# Patient Record
Sex: FEMALE | Race: WHITE | NOT HISPANIC OR LATINO | Employment: OTHER | URBAN - METROPOLITAN AREA
[De-identification: names, ages, dates, MRNs, and addresses within clinical notes are randomized per-mention and may not be internally consistent; named-entity substitution may affect disease eponyms.]

---

## 2017-04-14 ENCOUNTER — RECORDS - HEALTHEAST (OUTPATIENT)
Dept: LAB | Facility: CLINIC | Age: 82
End: 2017-04-14

## 2017-04-14 LAB
CHOLEST SERPL-MCNC: 163 MG/DL
FASTING STATUS PATIENT QL REPORTED: ABNORMAL
HDLC SERPL-MCNC: 38 MG/DL
LDLC SERPL CALC-MCNC: 92 MG/DL
TRIGL SERPL-MCNC: 163 MG/DL

## 2017-10-13 ENCOUNTER — RECORDS - HEALTHEAST (OUTPATIENT)
Dept: LAB | Facility: CLINIC | Age: 82
End: 2017-10-13

## 2017-10-13 LAB
CHOLEST SERPL-MCNC: 231 MG/DL
FASTING STATUS PATIENT QL REPORTED: ABNORMAL
HDLC SERPL-MCNC: 32 MG/DL
LDLC SERPL CALC-MCNC: 157 MG/DL
TRIGL SERPL-MCNC: 212 MG/DL

## 2018-04-13 ENCOUNTER — RECORDS - HEALTHEAST (OUTPATIENT)
Dept: LAB | Facility: CLINIC | Age: 83
End: 2018-04-13

## 2018-04-13 LAB
ALBUMIN SERPL-MCNC: 3.5 G/DL (ref 3.5–5)
ALP SERPL-CCNC: 86 U/L (ref 45–120)
ALT SERPL W P-5'-P-CCNC: 13 U/L (ref 0–45)
ANION GAP SERPL CALCULATED.3IONS-SCNC: 8 MMOL/L (ref 5–18)
AST SERPL W P-5'-P-CCNC: 23 U/L (ref 0–40)
BILIRUB SERPL-MCNC: 0.7 MG/DL (ref 0–1)
BUN SERPL-MCNC: 10 MG/DL (ref 8–28)
CALCIUM SERPL-MCNC: 8.9 MG/DL (ref 8.5–10.5)
CHLORIDE BLD-SCNC: 105 MMOL/L (ref 98–107)
CHOLEST SERPL-MCNC: 160 MG/DL
CO2 SERPL-SCNC: 26 MMOL/L (ref 22–31)
CREAT SERPL-MCNC: 0.83 MG/DL (ref 0.6–1.1)
FASTING STATUS PATIENT QL REPORTED: ABNORMAL
GFR SERPL CREATININE-BSD FRML MDRD: >60 ML/MIN/1.73M2
GLUCOSE BLD-MCNC: 99 MG/DL (ref 70–125)
HDLC SERPL-MCNC: 34 MG/DL
LDLC SERPL CALC-MCNC: 95 MG/DL
POTASSIUM BLD-SCNC: 4.2 MMOL/L (ref 3.5–5)
PROT SERPL-MCNC: 6.6 G/DL (ref 6–8)
SODIUM SERPL-SCNC: 139 MMOL/L (ref 136–145)
TRIGL SERPL-MCNC: 154 MG/DL

## 2018-11-17 ENCOUNTER — RECORDS - HEALTHEAST (OUTPATIENT)
Dept: LAB | Facility: CLINIC | Age: 83
End: 2018-11-17

## 2018-11-17 LAB
ANION GAP SERPL CALCULATED.3IONS-SCNC: 8 MMOL/L (ref 5–18)
BNP SERPL-MCNC: 30 PG/ML (ref 0–167)
BUN SERPL-MCNC: 13 MG/DL (ref 8–28)
CALCIUM SERPL-MCNC: 8.8 MG/DL (ref 8.5–10.5)
CHLORIDE BLD-SCNC: 106 MMOL/L (ref 98–107)
CO2 SERPL-SCNC: 24 MMOL/L (ref 22–31)
CREAT SERPL-MCNC: 0.83 MG/DL (ref 0.6–1.1)
GFR SERPL CREATININE-BSD FRML MDRD: >60 ML/MIN/1.73M2
GLUCOSE BLD-MCNC: 91 MG/DL (ref 70–125)
POTASSIUM BLD-SCNC: 4.2 MMOL/L (ref 3.5–5)
SODIUM SERPL-SCNC: 138 MMOL/L (ref 136–145)

## 2018-11-23 ENCOUNTER — RECORDS - HEALTHEAST (OUTPATIENT)
Dept: LAB | Facility: CLINIC | Age: 83
End: 2018-11-23

## 2018-11-23 LAB
C REACTIVE PROTEIN LHE: 5.6 MG/DL (ref 0–0.8)
RHEUMATOID FACT SERPL-ACNC: <15 IU/ML (ref 0–30)
URATE SERPL-MCNC: 8.7 MG/DL (ref 2–7.5)

## 2018-11-26 LAB
ANA SER QL: 0.2 U
B BURGDOR IGG+IGM SER QL: 0.39 INDEX VALUE

## 2018-12-03 ENCOUNTER — RECORDS - HEALTHEAST (OUTPATIENT)
Dept: LAB | Facility: CLINIC | Age: 83
End: 2018-12-03

## 2018-12-03 LAB — CK SERPL-CCNC: 28 U/L (ref 30–190)

## 2018-12-05 ENCOUNTER — OFFICE VISIT - HEALTHEAST (OUTPATIENT)
Dept: RHEUMATOLOGY | Facility: CLINIC | Age: 83
End: 2018-12-05

## 2018-12-05 DIAGNOSIS — M25.50 POLYARTHRALGIA: ICD-10-CM

## 2018-12-05 DIAGNOSIS — R70.0 ELEVATED SED RATE: ICD-10-CM

## 2018-12-05 DIAGNOSIS — M35.3 POLYMYALGIA RHEUMATICA (H): ICD-10-CM

## 2018-12-05 DIAGNOSIS — M15.0 PRIMARY OSTEOARTHRITIS INVOLVING MULTIPLE JOINTS: ICD-10-CM

## 2018-12-05 LAB
C REACTIVE PROTEIN LHE: 4.7 MG/DL (ref 0–0.8)
ERYTHROCYTE [SEDIMENTATION RATE] IN BLOOD BY WESTERGREN METHOD: 38 MM/HR (ref 0–20)

## 2018-12-05 ASSESSMENT — MIFFLIN-ST. JEOR: SCORE: 1236.89

## 2019-01-03 ENCOUNTER — OFFICE VISIT - HEALTHEAST (OUTPATIENT)
Dept: RHEUMATOLOGY | Facility: CLINIC | Age: 84
End: 2019-01-03

## 2019-01-03 DIAGNOSIS — M15.0 PRIMARY OSTEOARTHRITIS INVOLVING MULTIPLE JOINTS: ICD-10-CM

## 2019-01-03 DIAGNOSIS — R70.0 ELEVATED SED RATE: ICD-10-CM

## 2019-01-03 DIAGNOSIS — M25.50 POLYARTHRALGIA: ICD-10-CM

## 2019-01-03 DIAGNOSIS — M35.3 POLYMYALGIA RHEUMATICA (H): ICD-10-CM

## 2019-01-03 DIAGNOSIS — G56.01 RIGHT CARPAL TUNNEL SYNDROME: ICD-10-CM

## 2019-01-03 LAB
C REACTIVE PROTEIN LHE: 4.3 MG/DL (ref 0–0.8)
ERYTHROCYTE [SEDIMENTATION RATE] IN BLOOD BY WESTERGREN METHOD: 40 MM/HR (ref 0–20)

## 2019-02-19 ENCOUNTER — OFFICE VISIT - HEALTHEAST (OUTPATIENT)
Dept: RHEUMATOLOGY | Facility: CLINIC | Age: 84
End: 2019-02-19

## 2019-02-19 DIAGNOSIS — M35.3 POLYMYALGIA RHEUMATICA (H): ICD-10-CM

## 2019-02-19 DIAGNOSIS — M25.50 POLYARTHRALGIA: ICD-10-CM

## 2019-02-19 DIAGNOSIS — M15.0 PRIMARY OSTEOARTHRITIS INVOLVING MULTIPLE JOINTS: ICD-10-CM

## 2019-03-13 ENCOUNTER — RECORDS - HEALTHEAST (OUTPATIENT)
Dept: LAB | Facility: CLINIC | Age: 84
End: 2019-03-13

## 2019-03-13 LAB
ALBUMIN SERPL-MCNC: 3.4 G/DL (ref 3.5–5)
ALP SERPL-CCNC: 54 U/L (ref 45–120)
ALT SERPL W P-5'-P-CCNC: 16 U/L (ref 0–45)
ANION GAP SERPL CALCULATED.3IONS-SCNC: 10 MMOL/L (ref 5–18)
AST SERPL W P-5'-P-CCNC: 21 U/L (ref 0–40)
BILIRUB SERPL-MCNC: 0.4 MG/DL (ref 0–1)
BUN SERPL-MCNC: 25 MG/DL (ref 8–28)
CALCIUM SERPL-MCNC: 9.3 MG/DL (ref 8.5–10.5)
CHLORIDE BLD-SCNC: 105 MMOL/L (ref 98–107)
CHOLEST SERPL-MCNC: 230 MG/DL
CO2 SERPL-SCNC: 24 MMOL/L (ref 22–31)
CREAT SERPL-MCNC: 0.89 MG/DL (ref 0.6–1.1)
FASTING STATUS PATIENT QL REPORTED: ABNORMAL
GFR SERPL CREATININE-BSD FRML MDRD: 60 ML/MIN/1.73M2
GLUCOSE BLD-MCNC: 125 MG/DL (ref 70–125)
HDLC SERPL-MCNC: 48 MG/DL
LDLC SERPL CALC-MCNC: 132 MG/DL
MAGNESIUM SERPL-MCNC: 1.8 MG/DL (ref 1.8–2.6)
POTASSIUM BLD-SCNC: 4.6 MMOL/L (ref 3.5–5)
PROT SERPL-MCNC: 6.4 G/DL (ref 6–8)
SODIUM SERPL-SCNC: 139 MMOL/L (ref 136–145)
TRIGL SERPL-MCNC: 250 MG/DL

## 2019-03-15 ENCOUNTER — RECORDS - HEALTHEAST (OUTPATIENT)
Dept: LAB | Facility: CLINIC | Age: 84
End: 2019-03-15

## 2019-03-15 LAB
FERRITIN SERPL-MCNC: 177 NG/ML (ref 10–130)
IRON SATN MFR SERPL: 31 % (ref 20–50)
IRON SERPL-MCNC: 84 UG/DL (ref 42–175)
TIBC SERPL-MCNC: 269 UG/DL (ref 313–563)
TRANSFERRIN SERPL-MCNC: 215 MG/DL (ref 212–360)
VIT B12 SERPL-MCNC: 156 PG/ML (ref 213–816)

## 2019-03-31 ENCOUNTER — COMMUNICATION - HEALTHEAST (OUTPATIENT)
Dept: SCHEDULING | Facility: CLINIC | Age: 84
End: 2019-03-31

## 2019-04-18 ENCOUNTER — OFFICE VISIT - HEALTHEAST (OUTPATIENT)
Dept: RHEUMATOLOGY | Facility: CLINIC | Age: 84
End: 2019-04-18

## 2019-04-18 DIAGNOSIS — M25.50 POLYARTHRALGIA: ICD-10-CM

## 2019-04-18 DIAGNOSIS — Z79.899 HIGH RISK MEDICATION USE: ICD-10-CM

## 2019-04-18 DIAGNOSIS — M06.00 SERONEGATIVE RHEUMATOID ARTHRITIS (H): ICD-10-CM

## 2019-05-13 ENCOUNTER — AMBULATORY - HEALTHEAST (OUTPATIENT)
Dept: LAB | Facility: CLINIC | Age: 84
End: 2019-05-13

## 2019-05-13 DIAGNOSIS — Z79.899 HIGH RISK MEDICATION USE: ICD-10-CM

## 2019-05-13 LAB
ALBUMIN SERPL-MCNC: 3.7 G/DL (ref 3.5–5)
ALT SERPL W P-5'-P-CCNC: 18 U/L (ref 0–45)
CREAT SERPL-MCNC: 1.07 MG/DL (ref 0.6–1.1)
ERYTHROCYTE [DISTWIDTH] IN BLOOD BY AUTOMATED COUNT: 11.7 % (ref 11–14.5)
GFR SERPL CREATININE-BSD FRML MDRD: 48 ML/MIN/1.73M2
HCT VFR BLD AUTO: 39 % (ref 35–47)
HGB BLD-MCNC: 12.8 G/DL (ref 12–16)
MCH RBC QN AUTO: 29.8 PG (ref 27–34)
MCHC RBC AUTO-ENTMCNC: 32.8 G/DL (ref 32–36)
MCV RBC AUTO: 91 FL (ref 80–100)
PLATELET # BLD AUTO: 331 THOU/UL (ref 140–440)
PMV BLD AUTO: 7 FL (ref 7–10)
RBC # BLD AUTO: 4.29 MILL/UL (ref 3.8–5.4)
WBC: 13.6 THOU/UL (ref 4–11)

## 2019-05-16 ENCOUNTER — OFFICE VISIT - HEALTHEAST (OUTPATIENT)
Dept: RHEUMATOLOGY | Facility: CLINIC | Age: 84
End: 2019-05-16

## 2019-05-16 DIAGNOSIS — M25.50 POLYARTHRALGIA: ICD-10-CM

## 2019-05-16 DIAGNOSIS — M06.00 SERONEGATIVE RHEUMATOID ARTHRITIS (H): ICD-10-CM

## 2019-05-16 DIAGNOSIS — M15.0 PRIMARY OSTEOARTHRITIS INVOLVING MULTIPLE JOINTS: ICD-10-CM

## 2019-06-13 ENCOUNTER — AMBULATORY - HEALTHEAST (OUTPATIENT)
Dept: LAB | Facility: CLINIC | Age: 84
End: 2019-06-13

## 2019-06-13 DIAGNOSIS — Z79.899 HIGH RISK MEDICATION USE: ICD-10-CM

## 2019-06-13 LAB
ALBUMIN SERPL-MCNC: 3.7 G/DL (ref 3.5–5)
ALT SERPL W P-5'-P-CCNC: 15 U/L (ref 0–45)
CREAT SERPL-MCNC: 1.04 MG/DL (ref 0.6–1.1)
ERYTHROCYTE [DISTWIDTH] IN BLOOD BY AUTOMATED COUNT: 12.6 % (ref 11–14.5)
GFR SERPL CREATININE-BSD FRML MDRD: 50 ML/MIN/1.73M2
HCT VFR BLD AUTO: 35.3 % (ref 35–47)
HGB BLD-MCNC: 11.7 G/DL (ref 12–16)
MCH RBC QN AUTO: 30.1 PG (ref 27–34)
MCHC RBC AUTO-ENTMCNC: 33 G/DL (ref 32–36)
MCV RBC AUTO: 91 FL (ref 80–100)
PLATELET # BLD AUTO: 325 THOU/UL (ref 140–440)
PMV BLD AUTO: 6.9 FL (ref 7–10)
RBC # BLD AUTO: 3.87 MILL/UL (ref 3.8–5.4)
WBC: 9.1 THOU/UL (ref 4–11)

## 2019-06-26 ENCOUNTER — COMMUNICATION - HEALTHEAST (OUTPATIENT)
Dept: RHEUMATOLOGY | Facility: CLINIC | Age: 84
End: 2019-06-26

## 2019-06-26 DIAGNOSIS — M06.00 SERONEGATIVE RHEUMATOID ARTHRITIS (H): ICD-10-CM

## 2019-07-16 ENCOUNTER — AMBULATORY - HEALTHEAST (OUTPATIENT)
Dept: LAB | Facility: CLINIC | Age: 84
End: 2019-07-16

## 2019-07-16 DIAGNOSIS — Z79.899 HIGH RISK MEDICATION USE: ICD-10-CM

## 2019-07-16 LAB
ALBUMIN SERPL-MCNC: 3.6 G/DL (ref 3.5–5)
ALT SERPL W P-5'-P-CCNC: 13 U/L (ref 0–45)
CREAT SERPL-MCNC: 1.15 MG/DL (ref 0.6–1.1)
ERYTHROCYTE [DISTWIDTH] IN BLOOD BY AUTOMATED COUNT: 12.7 % (ref 11–14.5)
GFR SERPL CREATININE-BSD FRML MDRD: 44 ML/MIN/1.73M2
HCT VFR BLD AUTO: 31.7 % (ref 35–47)
HGB BLD-MCNC: 10.7 G/DL (ref 12–16)
MCH RBC QN AUTO: 31 PG (ref 27–34)
MCHC RBC AUTO-ENTMCNC: 33.7 G/DL (ref 32–36)
MCV RBC AUTO: 92 FL (ref 80–100)
PLATELET # BLD AUTO: 335 THOU/UL (ref 140–440)
PMV BLD AUTO: 7.3 FL (ref 7–10)
RBC # BLD AUTO: 3.44 MILL/UL (ref 3.8–5.4)
WBC: 5.8 THOU/UL (ref 4–11)

## 2019-07-23 ENCOUNTER — COMMUNICATION - HEALTHEAST (OUTPATIENT)
Dept: RHEUMATOLOGY | Facility: CLINIC | Age: 84
End: 2019-07-23

## 2019-07-23 DIAGNOSIS — M06.00 SERONEGATIVE RHEUMATOID ARTHRITIS (H): ICD-10-CM

## 2019-08-01 ENCOUNTER — COMMUNICATION - HEALTHEAST (OUTPATIENT)
Dept: ADMINISTRATIVE | Facility: CLINIC | Age: 84
End: 2019-08-01

## 2019-08-08 ENCOUNTER — AMBULATORY - HEALTHEAST (OUTPATIENT)
Dept: LAB | Facility: CLINIC | Age: 84
End: 2019-08-08

## 2019-08-08 DIAGNOSIS — Z79.899 HIGH RISK MEDICATION USE: ICD-10-CM

## 2019-08-08 LAB
ALBUMIN SERPL-MCNC: 3.4 G/DL (ref 3.5–5)
ALT SERPL W P-5'-P-CCNC: 24 U/L (ref 0–45)
CREAT SERPL-MCNC: 0.97 MG/DL (ref 0.6–1.1)
ERYTHROCYTE [DISTWIDTH] IN BLOOD BY AUTOMATED COUNT: 12.5 % (ref 11–14.5)
GFR SERPL CREATININE-BSD FRML MDRD: 54 ML/MIN/1.73M2
HCT VFR BLD AUTO: 34.4 % (ref 35–47)
HGB BLD-MCNC: 11.3 G/DL (ref 12–16)
MCH RBC QN AUTO: 30.6 PG (ref 27–34)
MCHC RBC AUTO-ENTMCNC: 32.7 G/DL (ref 32–36)
MCV RBC AUTO: 93 FL (ref 80–100)
PLATELET # BLD AUTO: 359 THOU/UL (ref 140–440)
PMV BLD AUTO: 7.3 FL (ref 7–10)
RBC # BLD AUTO: 3.68 MILL/UL (ref 3.8–5.4)
WBC: 6.4 THOU/UL (ref 4–11)

## 2019-08-13 ENCOUNTER — OFFICE VISIT - HEALTHEAST (OUTPATIENT)
Dept: RHEUMATOLOGY | Facility: CLINIC | Age: 84
End: 2019-08-13

## 2019-08-13 DIAGNOSIS — M06.00 SERONEGATIVE RHEUMATOID ARTHRITIS (H): ICD-10-CM

## 2019-08-13 DIAGNOSIS — M15.0 PRIMARY OSTEOARTHRITIS INVOLVING MULTIPLE JOINTS: ICD-10-CM

## 2019-08-13 DIAGNOSIS — G56.01 RIGHT CARPAL TUNNEL SYNDROME: ICD-10-CM

## 2019-08-13 RX ORDER — SENNOSIDES 8.6 MG
650 CAPSULE ORAL EVERY 8 HOURS PRN
Status: SHIPPED | COMMUNITY
Start: 2019-08-13 | End: 2023-01-01

## 2019-09-25 ENCOUNTER — RECORDS - HEALTHEAST (OUTPATIENT)
Dept: LAB | Facility: CLINIC | Age: 84
End: 2019-09-25

## 2019-09-25 LAB
ALBUMIN SERPL-MCNC: 4 G/DL (ref 3.5–5)
ALP SERPL-CCNC: 66 U/L (ref 45–120)
ALT SERPL W P-5'-P-CCNC: 15 U/L (ref 0–45)
ANION GAP SERPL CALCULATED.3IONS-SCNC: 7 MMOL/L (ref 5–18)
AST SERPL W P-5'-P-CCNC: 29 U/L (ref 0–40)
BILIRUB SERPL-MCNC: 0.3 MG/DL (ref 0–1)
BUN SERPL-MCNC: 15 MG/DL (ref 8–28)
C REACTIVE PROTEIN LHE: 2.4 MG/DL (ref 0–0.8)
CALCIUM SERPL-MCNC: 9.8 MG/DL (ref 8.5–10.5)
CHLORIDE BLD-SCNC: 107 MMOL/L (ref 98–107)
CHOLEST SERPL-MCNC: 180 MG/DL
CO2 SERPL-SCNC: 25 MMOL/L (ref 22–31)
CREAT SERPL-MCNC: 1.17 MG/DL (ref 0.6–1.1)
FASTING STATUS PATIENT QL REPORTED: ABNORMAL
GFR SERPL CREATININE-BSD FRML MDRD: 44 ML/MIN/1.73M2
GLUCOSE BLD-MCNC: 97 MG/DL (ref 70–125)
HDLC SERPL-MCNC: 41 MG/DL
LDLC SERPL CALC-MCNC: 102 MG/DL
POTASSIUM BLD-SCNC: 4.4 MMOL/L (ref 3.5–5)
PROT SERPL-MCNC: 6.7 G/DL (ref 6–8)
SODIUM SERPL-SCNC: 139 MMOL/L (ref 136–145)
TRIGL SERPL-MCNC: 187 MG/DL

## 2019-11-15 ENCOUNTER — AMBULATORY - HEALTHEAST (OUTPATIENT)
Dept: LAB | Facility: CLINIC | Age: 84
End: 2019-11-15

## 2019-11-15 DIAGNOSIS — Z79.899 HIGH RISK MEDICATION USE: ICD-10-CM

## 2019-11-15 LAB
ALBUMIN SERPL-MCNC: 3.7 G/DL (ref 3.5–5)
ALT SERPL W P-5'-P-CCNC: 10 U/L (ref 0–45)
CREAT SERPL-MCNC: 0.94 MG/DL (ref 0.6–1.1)
ERYTHROCYTE [DISTWIDTH] IN BLOOD BY AUTOMATED COUNT: 12.3 % (ref 11–14.5)
GFR SERPL CREATININE-BSD FRML MDRD: 56 ML/MIN/1.73M2
HCT VFR BLD AUTO: 37.2 % (ref 35–47)
HGB BLD-MCNC: 12.2 G/DL (ref 12–16)
MCH RBC QN AUTO: 29.9 PG (ref 27–34)
MCHC RBC AUTO-ENTMCNC: 32.8 G/DL (ref 32–36)
MCV RBC AUTO: 91 FL (ref 80–100)
PLATELET # BLD AUTO: 315 THOU/UL (ref 140–440)
PMV BLD AUTO: 7 FL (ref 7–10)
RBC # BLD AUTO: 4.07 MILL/UL (ref 3.8–5.4)
WBC: 6.9 THOU/UL (ref 4–11)

## 2019-11-19 ENCOUNTER — OFFICE VISIT - HEALTHEAST (OUTPATIENT)
Dept: RHEUMATOLOGY | Facility: CLINIC | Age: 84
End: 2019-11-19

## 2019-11-19 DIAGNOSIS — M06.00 SERONEGATIVE RHEUMATOID ARTHRITIS (H): ICD-10-CM

## 2019-11-19 DIAGNOSIS — M25.50 POLYARTHRALGIA: ICD-10-CM

## 2019-11-19 DIAGNOSIS — G56.03 CARPAL TUNNEL SYNDROME, BILATERAL: ICD-10-CM

## 2019-11-19 DIAGNOSIS — M15.0 PRIMARY OSTEOARTHRITIS INVOLVING MULTIPLE JOINTS: ICD-10-CM

## 2019-11-19 ASSESSMENT — MIFFLIN-ST. JEOR: SCORE: 1236.89

## 2019-12-17 ENCOUNTER — AMBULATORY - HEALTHEAST (OUTPATIENT)
Dept: LAB | Facility: CLINIC | Age: 84
End: 2019-12-17

## 2019-12-17 DIAGNOSIS — Z79.899 HIGH RISK MEDICATION USE: ICD-10-CM

## 2019-12-17 LAB
ALBUMIN SERPL-MCNC: 3.9 G/DL (ref 3.5–5)
ALT SERPL W P-5'-P-CCNC: 18 U/L (ref 0–45)
CREAT SERPL-MCNC: 0.85 MG/DL (ref 0.6–1.1)
ERYTHROCYTE [DISTWIDTH] IN BLOOD BY AUTOMATED COUNT: 15 % (ref 11–14.5)
GFR SERPL CREATININE-BSD FRML MDRD: >60 ML/MIN/1.73M2
HCT VFR BLD AUTO: 36.8 % (ref 35–47)
HGB BLD-MCNC: 11.7 G/DL (ref 12–16)
MCH RBC QN AUTO: 30.2 PG (ref 27–34)
MCHC RBC AUTO-ENTMCNC: 31.8 G/DL (ref 32–36)
MCV RBC AUTO: 95 FL (ref 80–100)
PLATELET # BLD AUTO: 255 THOU/UL (ref 140–440)
PMV BLD AUTO: 9.7 FL (ref 8.5–12.5)
RBC # BLD AUTO: 3.87 MILL/UL (ref 3.8–5.4)
WBC: 5.2 THOU/UL (ref 4–11)

## 2020-01-14 ENCOUNTER — AMBULATORY - HEALTHEAST (OUTPATIENT)
Dept: LAB | Facility: CLINIC | Age: 85
End: 2020-01-14

## 2020-01-14 DIAGNOSIS — Z79.899 HIGH RISK MEDICATION USE: ICD-10-CM

## 2020-01-14 LAB
ALBUMIN SERPL-MCNC: 3.9 G/DL (ref 3.5–5)
ALT SERPL W P-5'-P-CCNC: 21 U/L (ref 0–45)
CREAT SERPL-MCNC: 0.85 MG/DL (ref 0.6–1.1)
ERYTHROCYTE [DISTWIDTH] IN BLOOD BY AUTOMATED COUNT: 13.5 % (ref 11–14.5)
GFR SERPL CREATININE-BSD FRML MDRD: >60 ML/MIN/1.73M2
HCT VFR BLD AUTO: 36.9 % (ref 35–47)
HGB BLD-MCNC: 12.2 G/DL (ref 12–16)
MCH RBC QN AUTO: 30.6 PG (ref 27–34)
MCHC RBC AUTO-ENTMCNC: 33 G/DL (ref 32–36)
MCV RBC AUTO: 93 FL (ref 80–100)
PLATELET # BLD AUTO: 293 THOU/UL (ref 140–440)
PMV BLD AUTO: 7.2 FL (ref 7–10)
RBC # BLD AUTO: 3.97 MILL/UL (ref 3.8–5.4)
WBC: 4.3 THOU/UL (ref 4–11)

## 2020-02-05 ENCOUNTER — COMMUNICATION - HEALTHEAST (OUTPATIENT)
Dept: RHEUMATOLOGY | Facility: CLINIC | Age: 85
End: 2020-02-05

## 2020-02-05 DIAGNOSIS — M06.00 SERONEGATIVE RHEUMATOID ARTHRITIS (H): ICD-10-CM

## 2020-02-05 DIAGNOSIS — M25.50 POLYARTHRALGIA: ICD-10-CM

## 2020-02-11 ENCOUNTER — AMBULATORY - HEALTHEAST (OUTPATIENT)
Dept: LAB | Facility: CLINIC | Age: 85
End: 2020-02-11

## 2020-02-11 DIAGNOSIS — Z79.899 HIGH RISK MEDICATION USE: ICD-10-CM

## 2020-02-11 LAB
ALBUMIN SERPL-MCNC: 4 G/DL (ref 3.5–5)
ALT SERPL W P-5'-P-CCNC: 41 U/L (ref 0–45)
CREAT SERPL-MCNC: 0.85 MG/DL (ref 0.6–1.1)
ERYTHROCYTE [DISTWIDTH] IN BLOOD BY AUTOMATED COUNT: 12.8 % (ref 11–14.5)
GFR SERPL CREATININE-BSD FRML MDRD: >60 ML/MIN/1.73M2
HCT VFR BLD AUTO: 33.3 % (ref 35–47)
HGB BLD-MCNC: 11 G/DL (ref 12–16)
MCH RBC QN AUTO: 31.7 PG (ref 27–34)
MCHC RBC AUTO-ENTMCNC: 33 G/DL (ref 32–36)
MCV RBC AUTO: 96 FL (ref 80–100)
PLATELET # BLD AUTO: 277 THOU/UL (ref 140–440)
PMV BLD AUTO: 7.2 FL (ref 7–10)
RBC # BLD AUTO: 3.47 MILL/UL (ref 3.8–5.4)
WBC: 4.3 THOU/UL (ref 4–11)

## 2020-02-19 ENCOUNTER — OFFICE VISIT - HEALTHEAST (OUTPATIENT)
Dept: RHEUMATOLOGY | Facility: CLINIC | Age: 85
End: 2020-02-19

## 2020-02-19 DIAGNOSIS — M06.00 SERONEGATIVE RHEUMATOID ARTHRITIS (H): ICD-10-CM

## 2020-02-19 DIAGNOSIS — M15.0 PRIMARY OSTEOARTHRITIS INVOLVING MULTIPLE JOINTS: ICD-10-CM

## 2020-02-19 DIAGNOSIS — M35.3 POLYMYALGIA RHEUMATICA (H): ICD-10-CM

## 2020-02-19 DIAGNOSIS — G56.03 CARPAL TUNNEL SYNDROME, BILATERAL: ICD-10-CM

## 2020-02-19 DIAGNOSIS — M25.50 POLYARTHRALGIA: ICD-10-CM

## 2020-02-19 ASSESSMENT — MIFFLIN-ST. JEOR: SCORE: 1188.36

## 2020-05-05 ENCOUNTER — COMMUNICATION - HEALTHEAST (OUTPATIENT)
Dept: RHEUMATOLOGY | Facility: CLINIC | Age: 85
End: 2020-05-05

## 2020-05-05 DIAGNOSIS — M06.00 SERONEGATIVE RHEUMATOID ARTHRITIS (H): ICD-10-CM

## 2020-05-09 ENCOUNTER — COMMUNICATION - HEALTHEAST (OUTPATIENT)
Dept: LAB | Facility: CLINIC | Age: 85
End: 2020-05-09

## 2020-05-09 DIAGNOSIS — M06.00 SERONEGATIVE RHEUMATOID ARTHRITIS (H): ICD-10-CM

## 2020-05-22 ENCOUNTER — AMBULATORY - HEALTHEAST (OUTPATIENT)
Dept: LAB | Facility: CLINIC | Age: 85
End: 2020-05-22

## 2020-05-22 DIAGNOSIS — M06.00 SERONEGATIVE RHEUMATOID ARTHRITIS (H): ICD-10-CM

## 2020-05-22 LAB
ALBUMIN SERPL-MCNC: 4.1 G/DL (ref 3.5–5)
ALT SERPL W P-5'-P-CCNC: 12 U/L (ref 0–45)
CREAT SERPL-MCNC: 0.91 MG/DL (ref 0.6–1.1)
ERYTHROCYTE [DISTWIDTH] IN BLOOD BY AUTOMATED COUNT: 11.3 % (ref 11–14.5)
GFR SERPL CREATININE-BSD FRML MDRD: 58 ML/MIN/1.73M2
HCT VFR BLD AUTO: 33.3 % (ref 35–47)
HGB BLD-MCNC: 11.2 G/DL (ref 12–16)
MCH RBC QN AUTO: 32.7 PG (ref 27–34)
MCHC RBC AUTO-ENTMCNC: 33.6 G/DL (ref 32–36)
MCV RBC AUTO: 97 FL (ref 80–100)
PLATELET # BLD AUTO: 269 THOU/UL (ref 140–440)
PMV BLD AUTO: 7.8 FL (ref 7–10)
RBC # BLD AUTO: 3.42 MILL/UL (ref 3.8–5.4)
WBC: 4.5 THOU/UL (ref 4–11)

## 2020-05-26 ENCOUNTER — OFFICE VISIT - HEALTHEAST (OUTPATIENT)
Dept: RHEUMATOLOGY | Facility: CLINIC | Age: 85
End: 2020-05-26

## 2020-05-26 DIAGNOSIS — M06.00 SERONEGATIVE RHEUMATOID ARTHRITIS (H): ICD-10-CM

## 2020-05-26 DIAGNOSIS — Z79.899 HIGH RISK MEDICATION USE: ICD-10-CM

## 2020-05-26 DIAGNOSIS — M15.0 PRIMARY OSTEOARTHRITIS INVOLVING MULTIPLE JOINTS: ICD-10-CM

## 2020-05-26 DIAGNOSIS — M25.50 POLYARTHRALGIA: ICD-10-CM

## 2020-06-10 ENCOUNTER — COMMUNICATION - HEALTHEAST (OUTPATIENT)
Dept: ADMINISTRATIVE | Facility: CLINIC | Age: 85
End: 2020-06-10

## 2020-06-17 ENCOUNTER — OFFICE VISIT - HEALTHEAST (OUTPATIENT)
Dept: RHEUMATOLOGY | Facility: CLINIC | Age: 85
End: 2020-06-17

## 2020-06-17 DIAGNOSIS — G89.29 CHRONIC HAND PAIN, LEFT: ICD-10-CM

## 2020-06-17 DIAGNOSIS — M06.00 SERONEGATIVE RHEUMATOID ARTHRITIS (H): ICD-10-CM

## 2020-06-17 DIAGNOSIS — M79.642 CHRONIC HAND PAIN, LEFT: ICD-10-CM

## 2020-06-17 DIAGNOSIS — M65.312 TRIGGER THUMB OF LEFT HAND: ICD-10-CM

## 2020-08-24 ENCOUNTER — AMBULATORY - HEALTHEAST (OUTPATIENT)
Dept: LAB | Facility: CLINIC | Age: 85
End: 2020-08-24

## 2020-08-24 DIAGNOSIS — M06.00 SERONEGATIVE RHEUMATOID ARTHRITIS (H): ICD-10-CM

## 2020-08-24 LAB
ALBUMIN SERPL-MCNC: 3.8 G/DL (ref 3.5–5)
ALT SERPL W P-5'-P-CCNC: 14 U/L (ref 0–45)
CREAT SERPL-MCNC: 0.78 MG/DL (ref 0.6–1.1)
ERYTHROCYTE [DISTWIDTH] IN BLOOD BY AUTOMATED COUNT: 10.7 % (ref 11–14.5)
GFR SERPL CREATININE-BSD FRML MDRD: >60 ML/MIN/1.73M2
HCT VFR BLD AUTO: 34.7 % (ref 35–47)
HGB BLD-MCNC: 11.5 G/DL (ref 12–16)
MCH RBC QN AUTO: 32.8 PG (ref 27–34)
MCHC RBC AUTO-ENTMCNC: 33.3 G/DL (ref 32–36)
MCV RBC AUTO: 98 FL (ref 80–100)
PLATELET # BLD AUTO: 268 THOU/UL (ref 140–440)
PMV BLD AUTO: 7.3 FL (ref 7–10)
RBC # BLD AUTO: 3.52 MILL/UL (ref 3.8–5.4)
WBC: 5.4 THOU/UL (ref 4–11)

## 2020-08-25 ENCOUNTER — COMMUNICATION - HEALTHEAST (OUTPATIENT)
Dept: ADMINISTRATIVE | Facility: CLINIC | Age: 85
End: 2020-08-25

## 2020-08-25 DIAGNOSIS — M25.50 POLYARTHRALGIA: ICD-10-CM

## 2020-08-25 DIAGNOSIS — M06.00 SERONEGATIVE RHEUMATOID ARTHRITIS (H): ICD-10-CM

## 2020-09-14 ENCOUNTER — OFFICE VISIT - HEALTHEAST (OUTPATIENT)
Dept: RHEUMATOLOGY | Facility: CLINIC | Age: 85
End: 2020-09-14

## 2020-09-14 DIAGNOSIS — M15.0 PRIMARY OSTEOARTHRITIS INVOLVING MULTIPLE JOINTS: ICD-10-CM

## 2020-09-14 DIAGNOSIS — M06.00 SERONEGATIVE RHEUMATOID ARTHRITIS (H): ICD-10-CM

## 2020-09-14 DIAGNOSIS — M35.3 POLYMYALGIA RHEUMATICA (H): ICD-10-CM

## 2020-10-22 ENCOUNTER — COMMUNICATION - HEALTHEAST (OUTPATIENT)
Dept: RHEUMATOLOGY | Facility: CLINIC | Age: 85
End: 2020-10-22

## 2020-10-22 DIAGNOSIS — M06.00 SERONEGATIVE RHEUMATOID ARTHRITIS (H): ICD-10-CM

## 2020-12-11 ENCOUNTER — AMBULATORY - HEALTHEAST (OUTPATIENT)
Dept: LAB | Facility: CLINIC | Age: 85
End: 2020-12-11

## 2020-12-11 DIAGNOSIS — M06.00 SERONEGATIVE RHEUMATOID ARTHRITIS (H): ICD-10-CM

## 2020-12-11 LAB
ALBUMIN SERPL-MCNC: 3.9 G/DL (ref 3.5–5)
ALT SERPL W P-5'-P-CCNC: 12 U/L (ref 0–45)
CREAT SERPL-MCNC: 0.84 MG/DL (ref 0.6–1.1)
ERYTHROCYTE [DISTWIDTH] IN BLOOD BY AUTOMATED COUNT: 11.4 % (ref 11–14.5)
GFR SERPL CREATININE-BSD FRML MDRD: >60 ML/MIN/1.73M2
HCT VFR BLD AUTO: 32.9 % (ref 35–47)
HGB BLD-MCNC: 10.9 G/DL (ref 12–16)
MCH RBC QN AUTO: 31.8 PG (ref 27–34)
MCHC RBC AUTO-ENTMCNC: 33.2 G/DL (ref 32–36)
MCV RBC AUTO: 96 FL (ref 80–100)
PLATELET # BLD AUTO: 245 THOU/UL (ref 140–440)
PMV BLD AUTO: 7.3 FL (ref 7–10)
RBC # BLD AUTO: 3.42 MILL/UL (ref 3.8–5.4)
WBC: 4.4 THOU/UL (ref 4–11)

## 2020-12-14 ENCOUNTER — OFFICE VISIT - HEALTHEAST (OUTPATIENT)
Dept: RHEUMATOLOGY | Facility: CLINIC | Age: 85
End: 2020-12-14

## 2020-12-14 DIAGNOSIS — M25.50 POLYARTHRALGIA: ICD-10-CM

## 2020-12-14 DIAGNOSIS — Z79.899 HIGH RISK MEDICATION USE: ICD-10-CM

## 2020-12-14 DIAGNOSIS — M06.00 SERONEGATIVE RHEUMATOID ARTHRITIS (H): ICD-10-CM

## 2020-12-14 DIAGNOSIS — M15.0 PRIMARY OSTEOARTHRITIS INVOLVING MULTIPLE JOINTS: ICD-10-CM

## 2020-12-14 DIAGNOSIS — M35.3 POLYMYALGIA RHEUMATICA (H): ICD-10-CM

## 2021-02-11 ENCOUNTER — RECORDS - HEALTHEAST (OUTPATIENT)
Dept: LAB | Facility: CLINIC | Age: 86
End: 2021-02-11

## 2021-02-11 LAB
ALBUMIN SERPL-MCNC: 4.2 G/DL (ref 3.5–5)
ALP SERPL-CCNC: 64 U/L (ref 45–120)
ALT SERPL W P-5'-P-CCNC: 18 U/L (ref 0–45)
ANION GAP SERPL CALCULATED.3IONS-SCNC: 10 MMOL/L (ref 5–18)
AST SERPL W P-5'-P-CCNC: 35 U/L (ref 0–40)
BILIRUB SERPL-MCNC: 0.5 MG/DL (ref 0–1)
BUN SERPL-MCNC: 17 MG/DL (ref 8–28)
CALCIUM SERPL-MCNC: 9 MG/DL (ref 8.5–10.5)
CHLORIDE BLD-SCNC: 109 MMOL/L (ref 98–107)
CHOLEST SERPL-MCNC: 188 MG/DL
CO2 SERPL-SCNC: 23 MMOL/L (ref 22–31)
CREAT SERPL-MCNC: 0.84 MG/DL (ref 0.6–1.1)
FASTING STATUS PATIENT QL REPORTED: ABNORMAL
GFR SERPL CREATININE-BSD FRML MDRD: >60 ML/MIN/1.73M2
GLUCOSE BLD-MCNC: 94 MG/DL (ref 70–125)
HDLC SERPL-MCNC: 44 MG/DL
LDLC SERPL CALC-MCNC: 127 MG/DL
POTASSIUM BLD-SCNC: 4.1 MMOL/L (ref 3.5–5)
PROT SERPL-MCNC: 6.6 G/DL (ref 6–8)
SODIUM SERPL-SCNC: 142 MMOL/L (ref 136–145)
TRIGL SERPL-MCNC: 83 MG/DL
TSH SERPL DL<=0.005 MIU/L-ACNC: 3.43 UIU/ML (ref 0.3–5)

## 2021-03-15 ENCOUNTER — AMBULATORY - HEALTHEAST (OUTPATIENT)
Dept: LAB | Facility: CLINIC | Age: 86
End: 2021-03-15

## 2021-03-15 DIAGNOSIS — M06.00 SERONEGATIVE RHEUMATOID ARTHRITIS (H): ICD-10-CM

## 2021-03-15 LAB
ALBUMIN SERPL-MCNC: 4.1 G/DL (ref 3.5–5)
ALT SERPL W P-5'-P-CCNC: 38 U/L (ref 0–45)
CREAT SERPL-MCNC: 0.8 MG/DL (ref 0.6–1.1)
ERYTHROCYTE [DISTWIDTH] IN BLOOD BY AUTOMATED COUNT: 14.3 % (ref 11–14.5)
GFR SERPL CREATININE-BSD FRML MDRD: >60 ML/MIN/1.73M2
HCT VFR BLD AUTO: 34.3 % (ref 35–47)
HGB BLD-MCNC: 11 G/DL (ref 12–16)
MCH RBC QN AUTO: 31.8 PG (ref 27–34)
MCHC RBC AUTO-ENTMCNC: 32.1 G/DL (ref 32–36)
MCV RBC AUTO: 99 FL (ref 80–100)
PLATELET # BLD AUTO: 228 THOU/UL (ref 140–440)
PMV BLD AUTO: 9.2 FL (ref 7–10)
RBC # BLD AUTO: 3.46 MILL/UL (ref 3.8–5.4)
WBC: 4.4 THOU/UL (ref 4–11)

## 2021-03-18 ENCOUNTER — OFFICE VISIT - HEALTHEAST (OUTPATIENT)
Dept: RHEUMATOLOGY | Facility: CLINIC | Age: 86
End: 2021-03-18

## 2021-03-18 DIAGNOSIS — M06.00 SERONEGATIVE RHEUMATOID ARTHRITIS (H): ICD-10-CM

## 2021-03-18 DIAGNOSIS — M25.50 POLYARTHRALGIA: ICD-10-CM

## 2021-03-18 RX ORDER — HYDROXYCHLOROQUINE SULFATE 200 MG/1
200 TABLET, FILM COATED ORAL 2 TIMES DAILY
Qty: 180 TABLET | Refills: 0 | Status: SHIPPED | OUTPATIENT
Start: 2021-03-18 | End: 2022-01-01

## 2021-03-18 RX ORDER — SULFASALAZINE 500 MG/1
500 TABLET ORAL 2 TIMES DAILY
Qty: 180 TABLET | Refills: 0 | Status: SHIPPED | OUTPATIENT
Start: 2021-03-18 | End: 2021-12-23

## 2021-05-17 ENCOUNTER — RECORDS - HEALTHEAST (OUTPATIENT)
Dept: ADMINISTRATIVE | Facility: OTHER | Age: 86
End: 2021-05-17

## 2021-05-17 DIAGNOSIS — M06.00 SERONEGATIVE RHEUMATOID ARTHRITIS (H): ICD-10-CM

## 2021-05-17 RX ORDER — FOLIC ACID 1 MG/1
TABLET ORAL
Qty: 90 TABLET | Refills: 0 | Status: SHIPPED | OUTPATIENT
Start: 2021-05-17 | End: 2021-07-20

## 2021-05-18 ENCOUNTER — AMBULATORY - HEALTHEAST (OUTPATIENT)
Dept: RHEUMATOLOGY | Facility: CLINIC | Age: 86
End: 2021-05-18

## 2021-05-18 DIAGNOSIS — M06.00 SERONEGATIVE RHEUMATOID ARTHRITIS (H): ICD-10-CM

## 2021-05-27 NOTE — TELEPHONE ENCOUNTER
Call from patient       CC:  L hand / wrist / forearm swelling and pain     > Swelling in L hand / wrist and up forearm to the elbow - painful   > Started about 2 days ago and getting worse   > Fingers swollen - only able to make a  partial fist   > Similar episode 11/2018 with other arm     > Pain currently a 10   > No fever   > stiffness up to shoulders as well       A/P:   > Due to progressive nature of the event and degree of pain, directed to ED for hedyal       John Massey RN   Triage and Medication Refills            Reason for Disposition    SEVERE arm swelling (e.g., all of arm looks swollen)    Protocols used: ARM SWELLING AND EDEMA-A-AH

## 2021-05-27 NOTE — PROGRESS NOTES
ASSESSMENT AND PLAN:  Carissa Mercado 88 y.o. female is seen here on 04/18/19 for follow-up of systemic inflammatory syndrome with several features of seronegative rheumatoid arthritis responsive to prednisone had yet another flareup and ended up in the emergency room.  This time she is agreeable to starting methotrexate.  We will start off with a small dose.  While the methotrexate gets established she will stay on prednisone 10 mg daily.  She had wondered in fact if she could stay on prednisone indefinitely at this dose.  The reasons for not pursuing that type of course of action were reviewed with her and her son who accompanies her today.  Literature on methotrexate is provided.  She will check labs every 4 weeks.  We will meet here in 4 weeks with labs just prior.            Diagnoses and all orders for this visit:    Seronegative rheumatoid arthritis (H)  -     methotrexate 2.5 MG tablet; Take 4 tablets (10 mg total) by mouth once a week.  Dispense: 18 tablet; Refill: 0  -     folic acid (FOLVITE) 1 MG tablet; Take 1 tablet (1 mg total) by mouth daily.  Dispense: 30 tablet; Refill: 11  -     predniSONE (DELTASONE) 10 mg tablet; Take 10 mg by mouth daily.  Dispense: 30 tablet; Refill: 0    Polyarthralgia  -     predniSONE (DELTASONE) 10 mg tablet; Take 10 mg by mouth daily.  Dispense: 30 tablet; Refill: 0    High risk medication use  -     ALT (SGPT); Standing  -     Albumin; Standing  -     Creatinine; Standing  -     HM2(CBC w/o Differential); Standing      HISTORY OF PRESENTING ILLNESS:  Carissa Mercado, 88 y.o., female is here for follow-up.  She has a systemic inflammatory syndrome.  As she began to taper prednisone after her visit here on 2/19/2019 and went down to nearly 102 mg she flared up again.  This time it was in her left hand.  This is swollen and painful.  It was severe enough for her to go to the emergency room.  Where she was given a massive dose of prednisone.  Today she is accompanied by her  "son.  She noted the pain level was severe.  This precluded doing many day-to-day activities.  With stiffness sustained in the morning.  They wondered if she could stay on prednisone long-term.  We discussed the pros and cons.  On her previous visit we had talked about methotrexate.  She is more amenable to it on today's visit.  Major side effects are outlined the need for labs discussed. As noted previously her recent hospitalization resulted from her being  \"immobile\" because of the pain in her shoulders hips and knees ankles, right wrist was swollen.  She recalls that the pain level was 10/10.  It is hard for her to remember how it all began.  This patient was unable if at the beginning this was a sudden onset of issue or started one area or the other.  All she remembers is that she just could not do anything until she was admitted to the hospital.  Her sed rate was 84.  Her YAIMA anti-CCP antibody rheumatoid factor and ANCA were negative.  She denies headaches jaw claudication double vision.  She does not smoke does not take alcohol.  She has hypertension, she was able to recall that her cardiac issue included cardiomyopathy and ejection fraction had gone down to 15 and is now at 45.  Finally she ended up in the hospital.  Her daughter remembers that they gave her morphine they did not touch her pain.  Then they gave her prednisone.  She remembers that it almost took an hour before she started to get better.  Now she is back to her normal self.  She is on 40 mg of prednisone.  During the hospitalization her sed rate was elevated her anti-CCP antibody normal.  There is no family history of psoriasis ulcerative colitis, Crohn's disease rheumatoid arthritis or lupus.  She lives by herself in an apartment with great neighbors.  This.  Further historical information and ADL limitations as noted in the multidimensional health assessment questionnaire attached in the EMR.       ALLERGIES:Digoxin    PAST MEDICAL/ACTIVE " PROBLEMS/MEDICATION/ FAMILY HISTORY/SOCIAL DATA:  The patient has a family history of  Past Medical History:   Diagnosis Date     Dyslipidemia      Hypertension      Social History     Tobacco Use   Smoking Status Former Smoker     Years: 4.00     Types: Cigarettes   Smokeless Tobacco Never Used   Tobacco Comment    Quit 30 years ago     Patient Active Problem List   Diagnosis     Polymyalgia rheumatica (H)     Essential hypertension, benign     Dyslipidemia     Acute cystitis without hematuria     Polyarthralgia     Primary osteoarthritis involving multiple joints     Elevated sed rate     Right carpal tunnel syndrome     Current Outpatient Medications   Medication Sig Dispense Refill     carvedilol (COREG) 25 MG tablet Take 25 mg by mouth 2 (two) times a day with meals.       cyanocobalamin, vitamin B-12, (VITAMIN B12 ORAL) Take by mouth.       furosemide (LASIX) 20 MG tablet Take 20 mg by mouth daily.       lisinopril (PRINIVIL,ZESTRIL) 20 MG tablet Take 20 mg by mouth daily.       predniSONE (DELTASONE) 1 MG tablet 10 mg for 30 days, thereafter drop by 1 mg every 2 weeks to 9, 8, 7, 6, 5 mg daily.. 300 tablet 1     simvastatin (ZOCOR) 20 MG tablet Take 20 mg by mouth daily.       aspirin 81 MG EC tablet Take 81 mg by mouth daily.       No current facility-administered medications for this visit.      DETAILED EXAMINATION  04/18/19  :  Vitals:    04/18/19 1645   BP: 124/78   Patient Site: Right Arm   Patient Position: Sitting   Cuff Size: Adult Large   Pulse: 76   Weight: 181 lb (82.1 kg)     Alert oriented. Head including the face is examined for malar rash, heliotropes, scarring, lupus pernio. Eyes examined for redness such as in episcleritis/scleritis, periorbital lesions.   Neck/ Face examined for parotid gland swelling, range of motion of neck.  Left upper and lower and right upper and lower extremities examined for tenderness, swelling, warmth of the appendicular joints, range of motion, edema, rash.  Some  of the important findings included: Synovitis in multiple PIPs, minimal painful abduction at the shoulders  No joint line tenderness in the knees bilaterally without effusion or off.   LAB / IMAGING DATA:  ALT   Date Value Ref Range Status   03/13/2019 16 0 - 45 U/L Final   04/13/2018 13 0 - 45 U/L Final   10/13/2017 12 0 - 45 U/L Final     Albumin   Date Value Ref Range Status   03/13/2019 3.4 (L) 3.5 - 5.0 g/dL Final   04/13/2018 3.5 3.5 - 5.0 g/dL Final   10/13/2017 3.6 3.5 - 5.0 g/dL Final     Creatinine   Date Value Ref Range Status   03/13/2019 0.89 0.60 - 1.10 mg/dL Final   11/27/2018 0.90 0.60 - 1.10 mg/dL Final   11/17/2018 0.83 0.60 - 1.10 mg/dL Final       WBC   Date Value Ref Range Status   11/27/2018 14.7 (H) 4.0 - 11.0 thou/uL Final     Hemoglobin   Date Value Ref Range Status   11/27/2018 11.8 (L) 12.0 - 16.0 g/dL Final     Platelets   Date Value Ref Range Status   11/29/2018 349 140 - 440 thou/uL Final   11/27/2018 340 140 - 440 thou/uL Final       Lab Results   Component Value Date    RF <15.0 11/23/2018    SEDRATE 40 (H) 01/03/2019

## 2021-05-28 NOTE — PROGRESS NOTES
ASSESSMENT AND PLAN:  Carissa Mercado 88 y.o. female is seen here on 05/16/19 for follow-up of seronegative rheumatoid arthritis, osteoarthritis doing so much better, tolerated methotrexate, increase the dose to 20 mg/week splitting on Saturdays half in the morning half in the evening, and hydroxychloroquine major side effects outlined, literature on this provided, reduce prednisone as noted, follow-up here in 3 months with labs every 4 week.    Diagnoses and all orders for this visit:    Seronegative rheumatoid arthritis (H)  -     methotrexate 2.5 MG tablet; Take 8 tablets (20 mg total) by mouth once a week.  Dispense: 32 tablet; Refill: 1  -     folic acid (FOLVITE) 1 MG tablet; Take 1 tablet (1 mg total) by mouth daily.  Dispense: 30 tablet; Refill: 11  -     predniSONE (DELTASONE) 2.5 MG tablet; 7.5 mg/d prednisone PO for 1 month, reduce to 5 mg/d for the next month, and then reduce to 2.5 mg/d for the third month and then stop..  Dispense: 90 tablet; Refill: 2  -     hydroxychloroquine (PLAQUENIL) 200 mg tablet; Take 1 tablet (200 mg total) by mouth 2 (two) times a day.  Dispense: 60 tablet; Refill: 6    Primary osteoarthritis involving multiple joints    Polyarthralgia      HISTORY OF PRESENTING ILLNESS:  Carissa Mercado, 88 y.o., female is here for follow-up of seronegative rheumatoid arthritis, having tolerated methotrexate 10 mg/week nicely, pain level is 0/10, able to do all her day-to-day activities without difficulty, no stiffness in the morning.  She is doing all and also much better.  Her arthropathy was severe enough for her to go to the emergency room.  Where she was given a massive dose of prednisone.  Today she is accompanied by her daughter.  In the beginning of her current syndrome, all she remembers is that she just could not do anything until she was admitted to the hospital.  Her sed rate was 84.  Her YAIMA anti-CCP antibody rheumatoid factor and ANCA were negative.  She denies headaches jaw  claudication double vision.  She does not smoke does not take alcohol.  She has hypertension, she was able to recall that her cardiac issue included cardiomyopathy and ejection fraction had gone down to 15 and is now at 45.  Finally she ended up in the hospital.  Her daughter remembers that they gave her morphine they did not touch her pain.  Then they gave her prednisone.  She remembers that it almost took an hour before she started to get better.  Now she is back to her normal self.  She is on 40 mg of prednisone.  During the hospitalization her sed rate was elevated her anti-CCP antibody normal.  There is no family history of psoriasis ulcerative colitis, Crohn's disease rheumatoid arthritis or lupus.  She lives by herself in an apartment with great neighbors.  This.  Further historical information and ADL limitations as noted in the multidimensional health assessment questionnaire attached in the EMR.       ALLERGIES:Digoxin    PAST MEDICAL/ACTIVE PROBLEMS/MEDICATION/ FAMILY HISTORY/SOCIAL DATA:  The patient has a family history of  Past Medical History:   Diagnosis Date     Dyslipidemia      Hypertension      Social History     Tobacco Use   Smoking Status Former Smoker     Years: 4.00     Types: Cigarettes   Smokeless Tobacco Never Used   Tobacco Comment    Quit 30 years ago     Patient Active Problem List   Diagnosis     Polymyalgia rheumatica (H)     Essential hypertension, benign     Dyslipidemia     Acute cystitis without hematuria     Polyarthralgia     Primary osteoarthritis involving multiple joints     Elevated sed rate     Right carpal tunnel syndrome     Seronegative rheumatoid arthritis (H)     Current Outpatient Medications   Medication Sig Dispense Refill     aspirin 81 MG EC tablet Take 81 mg by mouth daily.       carvedilol (COREG) 25 MG tablet Take 25 mg by mouth 2 (two) times a day with meals.       cyanocobalamin, vitamin B-12, (VITAMIN B12 ORAL) Take by mouth.       folic acid (FOLVITE) 1 MG  tablet Take 1 tablet (1 mg total) by mouth daily. 30 tablet 11     furosemide (LASIX) 20 MG tablet Take 20 mg by mouth daily.       lisinopril (PRINIVIL,ZESTRIL) 20 MG tablet Take 20 mg by mouth daily.       methotrexate 2.5 MG tablet Take 4 tablets (10 mg total) by mouth once a week. 18 tablet 0     predniSONE (DELTASONE) 10 mg tablet Take 10 mg by mouth daily. 30 tablet 0     simvastatin (ZOCOR) 20 MG tablet Take 20 mg by mouth daily.       No current facility-administered medications for this visit.      DETAILED EXAMINATION  05/16/19  :  Vitals:    05/16/19 1518   BP: 120/60   Patient Site: Right Arm   Patient Position: Sitting   Cuff Size: Adult Large   Pulse: 72   Weight: 181 lb (82.1 kg)     Alert oriented. Head including the face is examined for malar rash, heliotropes, scarring, lupus pernio. Eyes examined for redness such as in episcleritis/scleritis, periorbital lesions.   Neck/ Face examined for parotid gland swelling, range of motion of neck.  Left upper and lower and right upper and lower extremities examined for tenderness, swelling, warmth of the appendicular joints, range of motion, edema, rash.  Some of the important findings included: She does not have evidence of synovitis in any of the palpable joints of the upper extremities.  No significant deformities of the digits.  No JLT effusion or warmth of the knees.  LAB / IMAGING DATA:  ALT   Date Value Ref Range Status   05/13/2019 18 0 - 45 U/L Final   03/13/2019 16 0 - 45 U/L Final   04/13/2018 13 0 - 45 U/L Final     Albumin   Date Value Ref Range Status   05/13/2019 3.7 3.5 - 5.0 g/dL Final   03/13/2019 3.4 (L) 3.5 - 5.0 g/dL Final   04/13/2018 3.5 3.5 - 5.0 g/dL Final     Creatinine   Date Value Ref Range Status   05/13/2019 1.07 0.60 - 1.10 mg/dL Final   03/13/2019 0.89 0.60 - 1.10 mg/dL Final   11/27/2018 0.90 0.60 - 1.10 mg/dL Final       WBC   Date Value Ref Range Status   05/13/2019 13.6 (H) 4.0 - 11.0 [facility]/uL Final   11/27/2018 14.7 (H)  4.0 - 11.0 thou/uL Final     Hemoglobin   Date Value Ref Range Status   05/13/2019 12.8 12.0 - 16.0 g/dL Final   11/27/2018 11.8 (L) 12.0 - 16.0 g/dL Final     Platelets   Date Value Ref Range Status   05/13/2019 331 140 - 440 thou/uL Final   11/29/2018 349 140 - 440 thou/uL Final   11/27/2018 340 140 - 440 thou/uL Final       Lab Results   Component Value Date    RF <15.0 11/23/2018    SEDRATE 40 (H) 01/03/2019

## 2021-05-31 NOTE — TELEPHONE ENCOUNTER
Vitaliy    States that she ended her treatment of prednisone yesterday, she has already had a bad flare up in her hands yesterday. She has taken Tylenol.    So wondering if there is something else she can do? She can be reached @ [phone]

## 2021-05-31 NOTE — TELEPHONE ENCOUNTER
Per Dr Burks- recommends pt try taking Tylenol Arthritis 2 tabs up to three times a day PRN. Pt notified and verbalized understanding.

## 2021-05-31 NOTE — PROGRESS NOTES
"ASSESSMENT AND PLAN:  Carissa Mercado 89 y.o. female is seen here on 08/13/19 for follow-up.  She has seronegative rheumatoid arthritis doing great on methotrexate that she is now going to split the dose off on Saturdays, recent labs within acceptable range, osteoarthritis management principles of which were reviewed, carpal tunnel syndrome in the right hand and injection earlier this year has helped her with the tingling and numbness.  Follow-up here in 3 months with labs prior.     Diagnoses and all orders for this visit:    Seronegative rheumatoid arthritis (H)  -     methotrexate 2.5 MG tablet; TAKE 8 TABLETS BY MOUTH ONCE A WEEK  Dispense: 96 tablet; Refill: 0  -     folic acid (FOLVITE) 1 MG tablet; Take 1 tablet (1 mg total) by mouth daily.  Dispense: 90 tablet; Refill: 0  -     hydroxychloroquine (PLAQUENIL) 200 mg tablet; Take 1 tablet (200 mg total) by mouth 2 (two) times a day.  Dispense: 180 tablet; Refill: 0    Primary osteoarthritis involving multiple joints    Right carpal tunnel syndrome      HISTORY OF PRESENTING ILLNESS:  Carissa Mercado, 89 y.o., female is here for follow-up of seronegative rheumatoid arthritis, osteoarthritis.  Doing great on methotrexate 20 mg/week, hydroxychloroquine, folic acid.  Recent labs within acceptable range.  She noted weakness in the right hand.  That she noted as \"pain\" turns out that it is not so much as pain as it is weakness.  She uses the opposite hand to help lift a cup of coffee.  Morning stiffness is no more than having tolerated methotrexate 10 mg/week nicely, pain level is 0/10, able to do all her day-to-day activities without difficulty, no stiffness in the morning.  She is doing all and also much better.  Her arthropathy was severe enough for her to go to the emergency room.  Where she was given a massive dose of prednisone.  Today she is accompanied by her son..  In the beginning of her current syndrome, all she remembers is that she just could not do " anything until she was admitted to the hospital.  Her sed rate was 84.      Her YAIMA anti-CCP antibody rheumaid factor and ANCA were negative.   She feels generally weak especially in her right hand.  She has noted the same in her right foot more so than the left.  She is going to check with her primary physician. She denies headaches jaw claudication double vision.  She does not smoke does not take alcohol.  She has hypertension, she was able to recall that her cardiac issue included cardiomyopathy and ejection fraction had gone down to 15 and is now at 45.  Finally she ended up in the hospital.  Her daughter remembers that they gave her morphine they did not touch her pain.  Then they gave her prednisone.  She remembers that it almost took an hour before she started to get better.  Now she is back to her normal self.  She is on 40 mg of prednisone.  During the hospitalization her sed rate was elevated her anti-CCP antibody normal.  There is no family history of psoriasis ulcerative colitis, Crohn's disease rheumatoid arthritis or lupus.  She lives by herself in an apartment with great neighbors.  This.  Further historical information and ADL limitations as noted in the multidimensional health assessment questionnaire attached in the EMR.       ALLERGIES:Digoxin    PAST MEDICAL/ACTIVE PROBLEMS/MEDICATION/ FAMILY HISTORY/SOCIAL DATA:  The patient has a family history of  Past Medical History:   Diagnosis Date     Dyslipidemia      Hypertension      Social History     Tobacco Use   Smoking Status Former Smoker     Years: 4.00     Types: Cigarettes   Smokeless Tobacco Never Used   Tobacco Comment    Quit 30 years ago     Patient Active Problem List   Diagnosis     Polymyalgia rheumatica (H)     Essential hypertension, benign     Dyslipidemia     Acute cystitis without hematuria     Polyarthralgia     Primary osteoarthritis involving multiple joints     Elevated sed rate     Right carpal tunnel syndrome     Seronegative  rheumatoid arthritis (H)     Current Outpatient Medications   Medication Sig Dispense Refill     aspirin 81 MG EC tablet Take 81 mg by mouth daily.       carvedilol (COREG) 25 MG tablet Take 25 mg by mouth 2 (two) times a day with meals.       cyanocobalamin, vitamin B-12, (VITAMIN B12 ORAL) Take by mouth.       folic acid (FOLVITE) 1 MG tablet Take 1 tablet (1 mg total) by mouth daily. 30 tablet 11     furosemide (LASIX) 20 MG tablet Take 20 mg by mouth daily.       lisinopril (PRINIVIL,ZESTRIL) 20 MG tablet Take 20 mg by mouth daily.       methotrexate 2.5 MG tablet TAKE 8 TABLETS BY MOUTH ONCE A WEEK 32 tablet 0     predniSONE (DELTASONE) 2.5 MG tablet 7.5 mg/d prednisone PO for 1 month, reduce to 5 mg/d for the next month, and then reduce to 2.5 mg/d for the third month and then stop.. 90 tablet 2     simvastatin (ZOCOR) 20 MG tablet Take 20 mg by mouth daily.       No current facility-administered medications for this visit.      DETAILED EXAMINATION  08/13/19  :  There were no vitals filed for this visit.  Alert oriented. Head including the face is examined for malar rash, heliotropes, scarring, lupus pernio. Eyes examined for redness such as in episcleritis/scleritis, periorbital lesions.   Neck/ Face examined for parotid gland swelling, range of motion of neck.  Left upper and lower and right upper and lower extremities examined for tenderness, swelling, warmth of the appendicular joints, range of motion, edema, rash.  Some of the important findings included: There is no synovitis in the palpable joints of upper extremities.  She has early Heberden's. No JLT effusion or warmth of the knees.  LAB / IMAGING DATA:  ALT   Date Value Ref Range Status   08/08/2019 24 0 - 45 U/L Final   07/16/2019 13 0 - 45 U/L Final   06/13/2019 15 0 - 45 U/L Final     Albumin   Date Value Ref Range Status   08/08/2019 3.4 (L) 3.5 - 5.0 g/dL Final   07/16/2019 3.6 3.5 - 5.0 g/dL Final   06/13/2019 3.7 3.5 - 5.0 g/dL Final      Creatinine   Date Value Ref Range Status   08/08/2019 0.97 0.60 - 1.10 mg/dL Final   07/16/2019 1.15 (H) 0.60 - 1.10 mg/dL Final   06/13/2019 1.04 0.60 - 1.10 mg/dL Final       WBC   Date Value Ref Range Status   08/08/2019 6.4 4.0 - 11.0 thou/uL Final   07/16/2019 5.8 4.0 - 11.0 thou/uL Final     Hemoglobin   Date Value Ref Range Status   08/08/2019 11.3 (L) 12.0 - 16.0 g/dL Final   07/16/2019 10.7 (L) 12.0 - 16.0 g/dL Final   06/13/2019 11.7 (L) 12.0 - 16.0 g/dL Final     Platelets   Date Value Ref Range Status   08/08/2019 359 140 - 440 thou/uL Final   07/16/2019 335 140 - 440 thou/uL Final   06/13/2019 325 140 - 440 thou/uL Final       Lab Results   Component Value Date    RF <15.0 11/23/2018    SEDRATE 40 (H) 01/03/2019

## 2021-05-31 NOTE — TELEPHONE ENCOUNTER
Normal letter mailed Pt states she completed prednisone 2.5mg daily on 7/20/19 and her hand pain started to come back within the past couple days. Pt states at first it was just numb and tingly but last night it was painful. Pt took some Tylenol and the pain is better. Pt wants to know what to do if pain comes back. Pt does not want to go back on prednisone as she doesn't want to have to taper off it again because it took a long time. Pt is taking MTX. And has an appt on 8/13/19 with Dr Burks

## 2021-06-02 VITALS — BODY MASS INDEX: 30.12 KG/M2 | WEIGHT: 181 LBS

## 2021-06-02 VITALS — WEIGHT: 181 LBS | BODY MASS INDEX: 30.16 KG/M2 | HEIGHT: 65 IN

## 2021-06-02 VITALS — WEIGHT: 181 LBS | BODY MASS INDEX: 30.12 KG/M2

## 2021-06-03 VITALS
WEIGHT: 181 LBS | DIASTOLIC BLOOD PRESSURE: 64 MMHG | SYSTOLIC BLOOD PRESSURE: 138 MMHG | BODY MASS INDEX: 30.16 KG/M2 | HEIGHT: 65 IN

## 2021-06-03 VITALS — BODY MASS INDEX: 30.12 KG/M2 | WEIGHT: 181 LBS

## 2021-06-03 NOTE — PROGRESS NOTES
ASSESSMENT AND PLAN:  Carissa Mercado 89 y.o. female is seen here on 11/19/19 for follow-up.  After initial improvement she has more pain and numbness and tingling in her hands especially at night, bilateral wrist pain.  She has active synovitis in her wrists.  She has features consistent with carpal tunnel syndrome worse on the left side.  She is not interested in surgical release.  I have outlined to her that part of the issue may be the synovitis causing flareup of carpal tunnel syndrome.  She would like to proceed local injection done after pros and cons were outlined under ultrasound guidance with 20 mg of Kenalog into the left carpal tunnel.  She is to stay on methotrexate.  Going to add sulfasalazine at a low dose, 500 mg twice daily.  Major side effects outlined.  Will meet here in 3 months.  Labs every 4 weeks.      Diagnoses and all orders for this visit:    Seronegative rheumatoid arthritis (H)  -     triamcinolone acetonide 40 mg/mL injection 20 mg (KENALOG-40)  -     sulfaSALAzine (AZULFIDINE) 500 mg tablet; Take 1 tablet (500 mg total) by mouth 2 (two) times a day.  Dispense: 60 tablet; Refill: 01  -     hydroxychloroquine (PLAQUENIL) 200 mg tablet; Take 1 tablet (200 mg total) by mouth 2 (two) times a day.  Dispense: 180 tablet; Refill: 0  -     methotrexate 2.5 MG tablet; TAKE 8 TABLETS BY MOUTH ONCE A WEEK  Dispense: 96 tablet; Refill: 0  -     folic acid (FOLVITE) 1 MG tablet; Take 1 tablet (1 mg total) by mouth daily.  Dispense: 90 tablet; Refill: 0    Primary osteoarthritis involving multiple joints    Polyarthralgia  -     sulfaSALAzine (AZULFIDINE) 500 mg tablet; Take 1 tablet (500 mg total) by mouth 2 (two) times a day.  Dispense: 60 tablet; Refill: 01    Carpal tunnel syndrome, bilateral  -     triamcinolone acetonide 40 mg/mL injection 20 mg (KENALOG-40)      HISTORY OF PRESENTING ILLNESS:  Carissa Mercado, 89 y.o., female is here for follow-up of seronegative rheumatoid arthritis,  "osteoarthritis.  She has noted numbness and tingling of her hands, especially at night, and stop rubbing, left is worse than the right, moderately severe symptoms, in the past in neurology she had what sounds like an EMG and was told that she has carpal tunnel syndrome, she is also noted pain in her wrists.  On methotrexate 20 mg/week, hydroxychloroquine, folic acid.  Recent labs within acceptable range.  She noted weakness in the hands bilaterally, worse on the left side that she noted as \"pain\" turns out that it is not so much as pain as it is weakness.  She uses the opposite hand to help lift a cup of coffee.  Morning stiffness is no more than having tolerated methotrexate 10 mg/week nicely, pain level is 0/10, able to do all her day-to-day activities without difficulty, no stiffness in the morning.  She is doing all and also much better.  Her arthropathy was severe enough for her to go to the emergency room.  Where she was given a massive dose of prednisone.  Today she is accompanied by her son..  In the beginning of her current syndrome, all she remembers is that she just could not do anything until she was admitted to the hospital.  Her sed rate was 84.      Her YAIMA anti-CCP antibody rheumaid factor and ANCA were negative.   She feels generally weak especially in her right hand.  She has noted the same in her right foot more so than the left.  She is going to check with her primary physician. She denies headaches jaw claudication double vision.  She does not smoke does not take alcohol.  She has hypertension, she was able to recall that her cardiac issue included cardiomyopathy and ejection fraction had gone down to 15 and is now at 45.  Finally she ended up in the hospital.  Her daughter remembers that they gave her morphine they did not touch her pain.  Then they gave her prednisone.  She remembers that it almost took an hour before she started to get better.  Now she is back to her normal self.  She is on " 40 mg of prednisone.  During the hospitalization her sed rate was elevated her anti-CCP antibody normal.  There is no family history of psoriasis ulcerative colitis, Crohn's disease rheumatoid arthritis or lupus.  She lives by herself in an apartment with great neighbors.  This.  Further historical information and ADL limitations as noted in the multidimensional health assessment questionnaire attached in the EMR.       ALLERGIES:Digoxin    PAST MEDICAL/ACTIVE PROBLEMS/MEDICATION/ FAMILY HISTORY/SOCIAL DATA:  The patient has a family history of  Past Medical History:   Diagnosis Date     Dyslipidemia      Hypertension      Social History     Tobacco Use   Smoking Status Former Smoker     Years: 4.00     Types: Cigarettes   Smokeless Tobacco Never Used   Tobacco Comment    Quit 30 years ago     Patient Active Problem List   Diagnosis     Polymyalgia rheumatica (H)     Essential hypertension, benign     Dyslipidemia     Acute cystitis without hematuria     Polyarthralgia     Primary osteoarthritis involving multiple joints     Elevated sed rate     Right carpal tunnel syndrome     Seronegative rheumatoid arthritis (H)     Current Outpatient Medications   Medication Sig Dispense Refill     acetaminophen (TYLENOL) 650 MG CR tablet Take 650 mg by mouth every 8 (eight) hours as needed.       carvedilol (COREG) 25 MG tablet Take 25 mg by mouth 2 (two) times a day with meals.       cyanocobalamin, vitamin B-12, (VITAMIN B12 ORAL) Take by mouth.       folic acid (FOLVITE) 1 MG tablet Take 1 tablet (1 mg total) by mouth daily. 90 tablet 0     furosemide (LASIX) 20 MG tablet Take 20 mg by mouth daily.       hydroxychloroquine (PLAQUENIL) 200 mg tablet Take 1 tablet (200 mg total) by mouth 2 (two) times a day. 180 tablet 0     lisinopril (PRINIVIL,ZESTRIL) 20 MG tablet Take 20 mg by mouth daily.       methotrexate 2.5 MG tablet TAKE 8 TABLETS BY MOUTH ONCE A WEEK 96 tablet 0     predniSONE (DELTASONE) 2.5 MG tablet 7.5 mg/d  prednisone PO for 1 month, reduce to 5 mg/d for the next month, and then reduce to 2.5 mg/d for the third month and then stop.. 90 tablet 2     simvastatin (ZOCOR) 20 MG tablet Take 20 mg by mouth daily.       No current facility-administered medications for this visit.      DETAILED EXAMINATION  11/19/19  :  There were no vitals filed for this visit.  Alert oriented. Head including the face is examined for malar rash, heliotropes, scarring, lupus pernio. Eyes examined for redness such as in episcleritis/scleritis, periorbital lesions.   Neck/ Face examined for parotid gland swelling, range of motion of neck.  Left upper and lower and right upper and lower extremities examined for tenderness, swelling, warmth of the appendicular joints, range of motion, edema, rash.  Some of the important findings included: She has tenderness of both her wrists with surgical swelling, Heberden's, positive Tinel's bilaterally worse on the left side wrist.       LAB / IMAGING DATA:  ALT   Date Value Ref Range Status   11/15/2019 10 0 - 45 U/L Final   09/25/2019 15 0 - 45 U/L Final   08/08/2019 24 0 - 45 U/L Final     Albumin   Date Value Ref Range Status   11/15/2019 3.7 3.5 - 5.0 g/dL Final   09/25/2019 4.0 3.5 - 5.0 g/dL Final   08/08/2019 3.4 (L) 3.5 - 5.0 g/dL Final     Creatinine   Date Value Ref Range Status   11/15/2019 0.94 0.60 - 1.10 mg/dL Final   09/25/2019 1.17 (H) 0.60 - 1.10 mg/dL Final   08/08/2019 0.97 0.60 - 1.10 mg/dL Final       WBC   Date Value Ref Range Status   11/15/2019 6.9 4.0 - 11.0 thou/uL Final   08/08/2019 6.4 4.0 - 11.0 thou/uL Final     Hemoglobin   Date Value Ref Range Status   11/15/2019 12.2 12.0 - 16.0 g/dL Final   08/08/2019 11.3 (L) 12.0 - 16.0 g/dL Final   07/16/2019 10.7 (L) 12.0 - 16.0 g/dL Final     Platelets   Date Value Ref Range Status   11/15/2019 315 140 - 440 thou/uL Final   08/08/2019 359 140 - 440 thou/uL Final   07/16/2019 335 140 - 440 thou/uL Final       Lab Results   Component Value  Date    RF <15.0 11/23/2018    SEDRATE 40 (H) 01/03/2019

## 2021-06-04 VITALS
SYSTOLIC BLOOD PRESSURE: 130 MMHG | WEIGHT: 170.3 LBS | HEIGHT: 65 IN | DIASTOLIC BLOOD PRESSURE: 74 MMHG | BODY MASS INDEX: 28.37 KG/M2

## 2021-06-04 VITALS
WEIGHT: 160 LBS | SYSTOLIC BLOOD PRESSURE: 132 MMHG | DIASTOLIC BLOOD PRESSURE: 66 MMHG | HEART RATE: 96 BPM | BODY MASS INDEX: 26.63 KG/M2

## 2021-06-06 NOTE — PROGRESS NOTES
ASSESSMENT AND PLAN:  Carissa Mercado 89 y.o. female is seen here on 02/19/20 for follow-up of seronegative rheumatoid arthritis, osteoarthritis, she has tolerated methotrexate hydroxychloroquine sulfasalazine nicely, 2/11/2020 labs reviewed within acceptable range mild anemia noted discussed with her.  She has not had recurrence of carpal tunnel symptoms.  Continue as now.  Management principles of OA reviewed.  Follow-up here in 3 months with labs prior.    Diagnoses and all orders for this visit:    Seronegative rheumatoid arthritis (H)  -     hydroxychloroquine (PLAQUENIL) 200 mg tablet; Take 1 tablet (200 mg total) by mouth 2 (two) times a day.  Dispense: 180 tablet; Refill: 0  -     sulfaSALAzine (AZULFIDINE) 500 mg tablet; Take 1 tablet (500 mg total) by mouth 2 (two) times a day.  Dispense: 180 tablet; Refill: 0  -     folic acid (FOLVITE) 1 MG tablet; Take 1 tablet (1 mg total) by mouth daily.  Dispense: 90 tablet; Refill: 0    Primary osteoarthritis involving multiple joints    Polyarthralgia  -     sulfaSALAzine (AZULFIDINE) 500 mg tablet; Take 1 tablet (500 mg total) by mouth 2 (two) times a day.  Dispense: 180 tablet; Refill: 0    Polymyalgia rheumatica (H)    Carpal tunnel syndrome, bilateral      HISTORY OF PRESENTING ILLNESS:  Carissa Mercado, 89 y.o., female is here for follow-up of seronegative rheumatoid arthritis, osteoarthritis.  Doing great on methotrexate 20 mg/week, hydroxychloroquine, sulfasalazine folic acid.  Recent labs within acceptable range.  She reports no recurrence of her joint symptoms away she had on previous visit she feels that there is been improvement in the numbness and tingling of her hands after the corticosteroid injection.  She is using a brace.  She had a more pain in her knees.  She felt fullness left knee popliteal area was told that she has Baker's cyst.   Today she is accompanied by her son..  In the beginning of her current syndrome, all she remembers is that she  just could not do anything until she was admitted to the hospital.  Her sed rate was 84.   There is no family history of psoriasis ulcerative colitis, Crohn's disease rheumatoid arthritis or lupus.  She lives by herself in an apartment with great neighbors.  This.  Further historical information and ADL limitations as noted in the multidimensional health assessment questionnaire attached in the EMR.      Her YAIMA anti-CCP antibody rheumaid factor and ANCA were negative.   She feels generally weak especially in her right hand.  She has noted the same in her right foot more so than the left.  She is going to check with her primary physician. She denies headaches jaw claudication double vision.  She does not smoke does not take alcohol.  She has hypertension, she was able to recall that her cardiac issue included cardiomyopathy and ejection fraction had gone down to 15 and is now at 45.  Finally she ended up in the hospital.  Her daughter remembers that they gave her morphine they did not touch her pain.  Then they gave her prednisone.  She remembers that it almost took an hour before she started to get better.  Now she is back to her normal self.  She is on 40 mg of prednisone.  During the hospitalization her sed rate was elevated her anti-CCP antibody normal.      ALLERGIES:Digoxin    PAST MEDICAL/ACTIVE PROBLEMS/MEDICATION/ FAMILY HISTORY/SOCIAL DATA:  The patient has a family history of  Past Medical History:   Diagnosis Date     Dyslipidemia      Hypertension      Social History     Tobacco Use   Smoking Status Former Smoker     Years: 4.00     Types: Cigarettes   Smokeless Tobacco Never Used   Tobacco Comment    Quit 30 years ago     Patient Active Problem List   Diagnosis     Polymyalgia rheumatica (H)     Essential hypertension, benign     Dyslipidemia     Acute cystitis without hematuria     Polyarthralgia     Primary osteoarthritis involving multiple joints     Elevated sed rate     Seronegative rheumatoid  "arthritis (H)     Carpal tunnel syndrome, bilateral     Current Outpatient Medications   Medication Sig Dispense Refill     acetaminophen (TYLENOL) 650 MG CR tablet Take 650 mg by mouth every 8 (eight) hours as needed.       carvedilol (COREG) 25 MG tablet Take 25 mg by mouth 2 (two) times a day with meals.       cyanocobalamin, vitamin B-12, (VITAMIN B12 ORAL) Take by mouth.       furosemide (LASIX) 20 MG tablet Take 20 mg by mouth daily.       hydroxychloroquine (PLAQUENIL) 200 mg tablet Take 1 tablet (200 mg total) by mouth 2 (two) times a day. 180 tablet 0     lisinopril (PRINIVIL,ZESTRIL) 20 MG tablet Take 20 mg by mouth daily.       methotrexate 2.5 MG tablet TAKE 8 TABLETS BY MOUTH ONCE A WEEK 96 tablet 0     predniSONE (DELTASONE) 2.5 MG tablet 7.5 mg/d prednisone PO for 1 month, reduce to 5 mg/d for the next month, and then reduce to 2.5 mg/d for the third month and then stop.. 90 tablet 2     simvastatin (ZOCOR) 20 MG tablet Take 20 mg by mouth daily.       sulfaSALAzine (AZULFIDINE) 500 mg tablet TAKE 1 TABLET BY MOUTH TWICE DAILY 60 tablet 1     folic acid (FOLVITE) 1 MG tablet Take 1 tablet (1 mg total) by mouth daily. 90 tablet 0     No current facility-administered medications for this visit.      DETAILED EXAMINATION  02/19/20  :  Vitals:    02/19/20 1104   BP: 130/74   Patient Site: Right Arm   Patient Position: Sitting   Cuff Size: Adult Regular   Weight: 170 lb 4.8 oz (77.2 kg)   Height: 5' 5\" (1.651 m)     Alert oriented. Head including the face is examined for malar rash, heliotropes, scarring, lupus pernio. Eyes examined for redness such as in episcleritis/scleritis, periorbital lesions.   Neck/ Face examined for parotid gland swelling, range of motion of neck.  Left upper and lower and right upper and lower extremities examined for tenderness, swelling, warmth of the appendicular joints, range of motion, edema, rash.  Some of the important findings included: No synovitis of palpable joints of " upper extremities, no impingement of the shoulders on abduction.  Minimal popliteal cyst on the left side.  .  LAB / IMAGING DATA:  ALT   Date Value Ref Range Status   02/11/2020 41 0 - 45 U/L Final   01/14/2020 21 0 - 45 U/L Final   12/17/2019 18 0 - 45 U/L Final     Albumin   Date Value Ref Range Status   02/11/2020 4.0 3.5 - 5.0 g/dL Final   01/14/2020 3.9 3.5 - 5.0 g/dL Final   12/17/2019 3.9 3.5 - 5.0 g/dL Final     Creatinine   Date Value Ref Range Status   02/11/2020 0.85 0.60 - 1.10 mg/dL Final   01/14/2020 0.85 0.60 - 1.10 mg/dL Final   12/17/2019 0.85 0.60 - 1.10 mg/dL Final       WBC   Date Value Ref Range Status   02/11/2020 4.3 4.0 - 11.0 thou/uL Final   01/14/2020 4.3 4.0 - 11.0 thou/uL Final     Hemoglobin   Date Value Ref Range Status   02/11/2020 11.0 (L) 12.0 - 16.0 g/dL Final   01/14/2020 12.2 12.0 - 16.0 g/dL Final   12/17/2019 11.7 (L) 12.0 - 16.0 g/dL Final     Platelets   Date Value Ref Range Status   02/11/2020 277 140 - 440 thou/uL Final   01/14/2020 293 140 - 440 thou/uL Final   12/17/2019 255 140 - 440 thou/uL Final       Lab Results   Component Value Date    RF <15.0 11/23/2018    SEDRATE 40 (H) 01/03/2019

## 2021-06-08 NOTE — PROGRESS NOTES
"Carissa Mercado is a 89 y.o. female who is being evaluated via a billable video visit.      The patient has been notified of following:     \"This video visit will be conducted via a call between you and your physician/provider. We have found that certain health care needs can be provided without the need for an in-person physical exam.  This service lets us provide the care you need with a video conversation.  If a prescription is necessary we can send it directly to your pharmacy.  If lab work is needed we can place an order for that and you can then stop by our lab to have the test done at a later time.    Video visits are billed at different rates depending on your insurance coverage. Please reach out to your insurance provider with any questions.    If during the course of the call the physician/provider feels a video visit is not appropriate, you will not be charged for this service.\"    Patient has given verbal consent to a Video visit? Yes    Patient would like to receive their AVS by AVS Preference: Gilberto.  Declined   Patient would like the video invitation sent by: Text to cell phone: [phone]    Will anyone else be joining your video visit? Yes: [phone]. How would they like to receive their invitation? Text to cell phone: [phone]          Video-Visit Details    Type of service:  Video Visit    Originating Location (pt. Location): Home    Distant Location (provider location):  [facility]     [facility] Visit[facility]            ASSESSMENT AND PLAN:    Diagnoses and all orders for this visit:    Primary osteoarthritis involving multiple joints    Seronegative rheumatoid arthritis (H)  -     methotrexate 2.5 MG tablet; Take 8 tablets (20 mg total) by mouth once a week.  Dispense: 96 tablet; Refill: 0  -     folic acid (FOLVITE) 1 MG tablet; Take 1 tablet (1 mg total) by mouth daily.  Dispense: 90 tablet; Refill: 0  -     hydroxychloroquine (PLAQUENIL) 200 mg tablet; " Take 1 tablet (200 mg total) by mouth 2 (two) times a day.  Dispense: 180 tablet; Refill: 0  -     sulfaSALAzine (AZULFIDINE) 500 mg tablet; Take 1 tablet (500 mg total) by mouth 2 (two) times a day.  Dispense: 180 tablet; Refill: 0    Polyarthralgia  -     sulfaSALAzine (AZULFIDINE) 500 mg tablet; Take 1 tablet (500 mg total) by mouth 2 (two) times a day.  Dispense: 180 tablet; Refill: 0    High risk medication use          HISTORY OF PRESENTING ILLNESS:  Carissa Mercado 89 y.o. is evaluated here via video link.  She has seronegative rheumatoid arthritis osteoarthritis, on methotrexate hydroxychloroquine and sulfasalazine.  Her son accompanied her.  Initial conversation held on the phone link and then changed to video.  She noted pain in her thumb.  This is on the left side.  This is gone on for the past 3 to 4 weeks.  This clicks and locks and causes pain.  She has no history of trauma there.  Rest of the joints are doing good.  She had labs drawn recently which are within acceptable range, stable anemia she has had intermittent drop in GFR.  She is aware not to take nonsteroidals.  We discussed options.  She may require a corticosteroid injection should this not resolve for what appears to be a triggering of the thumb on the video link.  Should this resolve spontaneously we will meet here in 3 months.      ROS enquiry held for fever, ocular symptoms, rash, headache,  GI issues.  Today we also discussed the issues related to the current pandemic, the pros and cons of the current treatment plan, the CDC guidelines such as social distancing washing the hands covering the cough.  ALLERGIES:Digoxin    PAST MEDICAL/ACTIVE PROBLEMS/MEDICATION/SOCIAL DATA  Past Medical History:   Diagnosis Date     Dyslipidemia      Hypertension      Social History     Tobacco Use   Smoking Status Former Smoker     Years: 4.00     Types: Cigarettes   Smokeless Tobacco Never Used   Tobacco Comment    Quit 30 years ago     Patient Active  Problem List   Diagnosis     Polymyalgia rheumatica (H)     Essential hypertension, benign     Dyslipidemia     Acute cystitis without hematuria     Polyarthralgia     Primary osteoarthritis involving multiple joints     Elevated sed rate     Seronegative rheumatoid arthritis (H)     Carpal tunnel syndrome, bilateral     Current Outpatient Medications   Medication Sig Dispense Refill     acetaminophen (TYLENOL) 650 MG CR tablet Take 650 mg by mouth every 8 (eight) hours as needed.       carvedilol (COREG) 25 MG tablet Take 25 mg by mouth 2 (two) times a day with meals.       cyanocobalamin, vitamin B-12, (VITAMIN B12 ORAL) Take by mouth.       furosemide (LASIX) 20 MG tablet Take 20 mg by mouth daily.       hydroxychloroquine (PLAQUENIL) 200 mg tablet Take 1 tablet (200 mg total) by mouth 2 (two) times a day. 180 tablet 0     lisinopril (PRINIVIL,ZESTRIL) 20 MG tablet Take 20 mg by mouth daily.       methotrexate 2.5 MG tablet TAKE 8 TABLETS BY MOUTH ONCE A WEEK 32 tablet 0     simvastatin (ZOCOR) 20 MG tablet Take 20 mg by mouth daily.       folic acid (FOLVITE) 1 MG tablet Take 1 tablet (1 mg total) by mouth daily. 90 tablet 0     sulfaSALAzine (AZULFIDINE) 500 mg tablet Take 1 tablet (500 mg total) by mouth 2 (two) times a day. 180 tablet 0     No current facility-administered medications for this visit.          EXAMINATION:    Using the audio and video link as best as possible the constitutional, neck, neurologic, psych, skin, both upper extremities areas/organ system were evaluated during this assessment.  Some of the important findings: She appears to have triggering of the left thumb, she has difficulty flexing it at the IP joint of the left side.  She has squaring the first CMC's, there is no swelling of the MCPs abduction of the shoulders appears to be full.      LAB / IMAGING DATA:  ALT   Date Value Ref Range Status   05/22/2020 12 0 - 45 U/L Final   02/11/2020 41 0 - 45 U/L Final   01/14/2020 21 0 - 45  U/L Final     Albumin   Date Value Ref Range Status   05/22/2020 4.1 3.5 - 5.0 g/dL Final   02/11/2020 4.0 3.5 - 5.0 g/dL Final   01/14/2020 3.9 3.5 - 5.0 g/dL Final     Creatinine   Date Value Ref Range Status   05/22/2020 0.91 0.60 - 1.10 mg/dL Final   02/11/2020 0.85 0.60 - 1.10 mg/dL Final   01/14/2020 0.85 0.60 - 1.10 mg/dL Final       WBC   Date Value Ref Range Status   05/22/2020 4.5 4.0 - 11.0 thou/uL Final   02/11/2020 4.3 4.0 - 11.0 thou/uL Final     Hemoglobin   Date Value Ref Range Status   05/22/2020 11.2 (L) 12.0 - 16.0 g/dL Final   02/11/2020 11.0 (L) 12.0 - 16.0 g/dL Final   01/14/2020 12.2 12.0 - 16.0 g/dL Final     Platelets   Date Value Ref Range Status   05/22/2020 269 140 - 440 thou/uL Final   02/11/2020 277 140 - 440 thou/uL Final   01/14/2020 293 140 - 440 thou/uL Final       Lab Results   Component Value Date    RF <15.0 11/23/2018    SEDRATE 40 (H) 01/03/2019     Duration of the call:7 Minutes  Call start: 107  pm  Call end:   116pm more than 1 calls.

## 2021-06-08 NOTE — PROGRESS NOTES
ASSESSMENT AND PLAN:  Carissa Mercado 89 y.o. female is seen here on 06/17/20 for evaluation of painful left hand, epicentered left thumb which she has marked triggering, she would like to proceed local injection, 20 mg of Kenalog injected into the flexor tendon sheath at the A1 level.  She is to follow-up in the next 3 to 4 months.  Diagnoses and all orders for this visit:    Trigger thumb of left hand  -     triamcinolone acetonide 40 mg/mL injection 20 mg (KENALOG-40)    Chronic hand pain, left    Seronegative rheumatoid arthritis (H)          HISTORY OF PRESENTING ILLNESS ON 06/17/20 :  Carissa Mercado 89 y.o. is here for a moderately severe flare up of pain. Here for a moderately severe flare up of pain.  Joints affected include Left hand pain, this is epicentered of the left thumb, where she has clicking and locking, interferes with day-to-day function.. This has gone on for several weeks. Pain is described as sharp. It is worse with activity at times bedtime..  Her symptoms are moderately severe. The symptoms are progressive.  Associated findings  do not include: swelling, rash.  There is no associated recent fall or trauma.  Over-the-counter treatment to date has been without significant relief.    Further historical information, including ROS and limitation in activities as noted in the multidimensional health assessment questionnaire scanned in the EMR and in the assessment and plan section.    ALLERGIES:Digoxin    PAST MEDICAL/ACTIVE PROBLEMS/MEDICATION/SOCIAL DATA  Past Medical History:   Diagnosis Date     Dyslipidemia      Hypertension      Social History     Tobacco Use   Smoking Status Former Smoker     Years: 4.00     Types: Cigarettes   Smokeless Tobacco Never Used   Tobacco Comment    Quit 30 years ago     Patient Active Problem List   Diagnosis     Polymyalgia rheumatica (H)     Essential hypertension, benign     Dyslipidemia     Acute cystitis without hematuria     Polyarthralgia     Primary  osteoarthritis involving multiple joints     Elevated sed rate     Seronegative rheumatoid arthritis (H)     Carpal tunnel syndrome, bilateral     High risk medication use     Current Outpatient Medications   Medication Sig Dispense Refill     acetaminophen (TYLENOL) 650 MG CR tablet Take 650 mg by mouth every 8 (eight) hours as needed.       carvedilol (COREG) 25 MG tablet Take 25 mg by mouth 2 (two) times a day with meals.       cyanocobalamin, vitamin B-12, (VITAMIN B12 ORAL) Take by mouth.       folic acid (FOLVITE) 1 MG tablet Take 1 tablet (1 mg total) by mouth daily. 90 tablet 0     furosemide (LASIX) 20 MG tablet Take 20 mg by mouth daily.       hydroxychloroquine (PLAQUENIL) 200 mg tablet Take 1 tablet (200 mg total) by mouth 2 (two) times a day. 180 tablet 0     lisinopril (PRINIVIL,ZESTRIL) 20 MG tablet Take 20 mg by mouth daily.       methotrexate 2.5 MG tablet Take 8 tablets (20 mg total) by mouth once a week. 96 tablet 0     simvastatin (ZOCOR) 20 MG tablet Take 20 mg by mouth daily.       sulfaSALAzine (AZULFIDINE) 500 mg tablet Take 1 tablet (500 mg total) by mouth 2 (two) times a day. 180 tablet 0     No current facility-administered medications for this visit.          DETAILED EXAMINATION  06/17/20  :  Vitals:    06/17/20 1028   BP: 132/66   Patient Site: Right Arm   Patient Position: Sitting   Cuff Size: Adult Large   Pulse: 96   Weight: 160 lb (72.6 kg)     Alert oriented. Head including the face is examined for malar rash, heliotropes, scarring, lupus pernio. Eyes examined for redness such as in episcleritis/scleritis, periorbital lesions.   Neck/ Face examined for parotid gland swelling, range of motion of neck.  Left upper and lower and right upper and lower extremities examined for tenderness, swelling, warmth of the appendicular joints, range of motion, edema, rash.  Some of the important findings included: Exquisitely tender left thumb flexor tendon A1 nodularity.  Heberden's.  No  synovitis in the small joints of the hands.           LAB / IMAGING DATA:  ALT   Date Value Ref Range Status   05/22/2020 12 0 - 45 U/L Final   02/11/2020 41 0 - 45 U/L Final   01/14/2020 21 0 - 45 U/L Final     Albumin   Date Value Ref Range Status   05/22/2020 4.1 3.5 - 5.0 g/dL Final   02/11/2020 4.0 3.5 - 5.0 g/dL Final   01/14/2020 3.9 3.5 - 5.0 g/dL Final     Creatinine   Date Value Ref Range Status   05/22/2020 0.91 0.60 - 1.10 mg/dL Final   02/11/2020 0.85 0.60 - 1.10 mg/dL Final   01/14/2020 0.85 0.60 - 1.10 mg/dL Final       WBC   Date Value Ref Range Status   05/22/2020 4.5 4.0 - 11.0 thou/uL Final   02/11/2020 4.3 4.0 - 11.0 thou/uL Final     Hemoglobin   Date Value Ref Range Status   05/22/2020 11.2 (L) 12.0 - 16.0 g/dL Final   02/11/2020 11.0 (L) 12.0 - 16.0 g/dL Final   01/14/2020 12.2 12.0 - 16.0 g/dL Final     Platelets   Date Value Ref Range Status   05/22/2020 269 140 - 440 thou/uL Final   02/11/2020 277 140 - 440 thou/uL Final   01/14/2020 293 140 - 440 thou/uL Final       Lab Results   Component Value Date    RF <15.0 11/23/2018    SEDRATE 40 (H) 01/03/2019

## 2021-06-08 NOTE — TELEPHONE ENCOUNTER
Spoke to pts daughter Adali- arranged for pt to come in on 6/17 for injection with Dr Burks at 10:30am.

## 2021-06-09 ENCOUNTER — RECORDS - HEALTHEAST (OUTPATIENT)
Dept: ADMINISTRATIVE | Facility: CLINIC | Age: 86
End: 2021-06-09

## 2021-06-10 NOTE — TELEPHONE ENCOUNTER
Date: 9/14/2020 Status: [facility]   Time: 4:15 PM Length: 15   Visit Type: VIDEO VISIT RETURN [1702] Copay: $0.00   Provider: Maribel Burks MBBS

## 2021-06-10 NOTE — TELEPHONE ENCOUNTER
08/27 - called x 2, notified that pt needs a f/u some time sept/oct. Patient states she needs to coordinate appt with son, so that he can help her. She will have her son call us back to schedule.

## 2021-06-10 NOTE — TELEPHONE ENCOUNTER
Labs stable/ WNL. Pt was to see Dr Burks 3-4 mo from her 6/2020 appt. Please schedule a f/u.   RX's sent.

## 2021-06-11 NOTE — PROGRESS NOTES
Sundeep MATA, the son was asking a call back later.     Kimber Karimi, [facility] 9/14/2020 12:54 PM

## 2021-06-11 NOTE — PROGRESS NOTES
"Carissa Mercado is a 89 y.o. female who is being evaluated via a billable video visit.      The patient has been notified of following:     \"This video visit will be conducted via a call between you and your physician/provider. We have found that certain health care needs can be provided without the need for an in-person physical exam.  This service lets us provide the care you need with a video conversation.  If a prescription is necessary we can send it directly to your pharmacy.  If lab work is needed we can place an order for that and you can then stop by our lab to have the test done at a later time.    Video visits are billed at different rates depending on your insurance coverage. Please reach out to your insurance provider with any questions.    If during the course of the call the physician/provider feels a video visit is not appropriate, you will not be charged for this service.\"    Patient has given verbal consent to a Video visit? Yes  How would you like to obtain your AVS? AVS Preference: MyChart.  If dropped by the video visit, the video invitation should be sent to: Text to cell phone: [phone]  Will anyone else be joining your video visit? Yes: [phone]. How would they like to receive their invitation? Text to cell phone: [phone]        Video-Visit Details    Type of service:  Video Visit    Originating Location (pt. Location): Home    Distant Location (provider location):  [facility]     Platform used for Video Visit: [employer]      ASSESSMENT AND PLAN:    Diagnoses and all orders for this visit:    Seronegative rheumatoid arthritis (H)  -     methotrexate 2.5 MG tablet; Take 8 tablets (20 mg total) by mouth once a week.  Dispense: 64 tablet; Refill: 0    Primary osteoarthritis involving multiple joints    Polymyalgia rheumatica (H)          HISTORY OF PRESENTING ILLNESS:  Carissa Mercado 89 y.o. is evaluated here via video link.  This is for follow-up.  She is accompanied by " her son, the conversation happened on his phone.  This is for rheumatoid arthritis.  This is longstanding.  This is seronegative.  She reports doing quite well with her current regimen.  Her recent labs are within acceptable range.  She had eye examination a few months ago.  She noted pain.  This is in her left knee.  Worse with activity.  Uncontrolled.  Not having stairs at home.  No history of trauma.   ROS enquiry held for fever, ocular symptoms, rash, headache,  GI issues.  Today we also discussed the issues related to the current pandemic, the pros and cons of the current treatment plan, the CDC guidelines such as social distancing washing the hands covering the cough.  ALLERGIES:Digoxin    PAST MEDICAL/ACTIVE PROBLEMS/MEDICATION/SOCIAL DATA  Past Medical History:   Diagnosis Date     Dyslipidemia      Hypertension      Social History     Tobacco Use   Smoking Status Former Smoker     Years: 4.00     Types: Cigarettes   Smokeless Tobacco Never Used   Tobacco Comment    Quit 30 years ago     Patient Active Problem List   Diagnosis     Polymyalgia rheumatica (H)     Essential hypertension, benign     Dyslipidemia     Acute cystitis without hematuria     Polyarthralgia     Primary osteoarthritis involving multiple joints     Elevated sed rate     Seronegative rheumatoid arthritis (H)     Carpal tunnel syndrome, bilateral     High risk medication use     Current Outpatient Medications   Medication Sig Dispense Refill     acetaminophen (TYLENOL) 650 MG CR tablet Take 650 mg by mouth every 8 (eight) hours as needed.       carvedilol (COREG) 25 MG tablet Take 25 mg by mouth 2 (two) times a day with meals.       cyanocobalamin, vitamin B-12, (VITAMIN B12 ORAL) Take by mouth.       folic acid (FOLVITE) 1 MG tablet Take 1 tablet (1 mg total) by mouth daily. 90 tablet 0     furosemide (LASIX) 20 MG tablet Take 20 mg by mouth daily.       hydrOXYchloroQUINE (PLAQUENIL) 200 mg tablet Take 1 tablet (200 mg total) by mouth 2  (two) times a day. 180 tablet 0     lisinopril (PRINIVIL,ZESTRIL) 20 MG tablet Take 20 mg by mouth daily.       methotrexate 2.5 MG tablet Take 8 tablets (20 mg total) by mouth once a week. 64 tablet 0     simvastatin (ZOCOR) 20 MG tablet Take 20 mg by mouth daily.       sulfaSALAzine (AZULFIDINE) 500 mg tablet Take 1 tablet (500 mg total) by mouth 2 (two) times a day. 120 tablet 0     No current facility-administered medications for this visit.          EXAMINATION:    Using the audio and video link as best as possible the constitutional, neck, neurologic, psych, skin, both upper extremities areas/organ system were evaluated during this assessment.  Some of the important findings: She has Heberden's, she is able to fully flex the digits and wrist bilaterally, abduction of the shoulders is normal.      LAB / IMAGING DATA:  ALT   Date Value Ref Range Status   08/24/2020 14 0 - 45 U/L Final   05/22/2020 12 0 - 45 U/L Final   02/11/2020 41 0 - 45 U/L Final     Albumin   Date Value Ref Range Status   08/24/2020 3.8 3.5 - 5.0 g/dL Final   05/22/2020 4.1 3.5 - 5.0 g/dL Final   02/11/2020 4.0 3.5 - 5.0 g/dL Final     Creatinine   Date Value Ref Range Status   08/24/2020 0.78 0.60 - 1.10 mg/dL Final   05/22/2020 0.91 0.60 - 1.10 mg/dL Final   02/11/2020 0.85 0.60 - 1.10 mg/dL Final       WBC   Date Value Ref Range Status   08/24/2020 5.4 4.0 - 11.0 thou/uL Final   05/22/2020 4.5 4.0 - 11.0 thou/uL Final     Hemoglobin   Date Value Ref Range Status   08/24/2020 11.5 (L) 12.0 - 16.0 g/dL Final   05/22/2020 11.2 (L) 12.0 - 16.0 g/dL Final   02/11/2020 11.0 (L) 12.0 - 16.0 g/dL Final     Platelets   Date Value Ref Range Status   08/24/2020 268 140 - 440 thou/uL Final   05/22/2020 269 140 - 440 thou/uL Final   02/11/2020 277 140 - 440 thou/uL Final       Lab Results   Component Value Date    RF <15.0 11/23/2018    SEDRATE 40 (H) 01/03/2019     Duration of the call:8  Minutes  Call start: 434  pm  Call end:   442pm

## 2021-06-13 NOTE — PROGRESS NOTES
"Carissa Mercado is a 89 y.o. female who is being evaluated via a billable video visit.      The patient has been notified of following:     \"This video visit will be conducted via a call between you and your physician/provider. We have found that certain health care needs can be provided without the need for an in-person physical exam.  This service lets us provide the care you need with a video conversation.  If a prescription is necessary we can send it directly to your pharmacy.  If lab work is needed we can place an order for that and you can then stop by our lab to have the test done at a later time.    Video visits are billed at different rates depending on your insurance coverage. Please reach out to your insurance provider with any questions.    If during the course of the call the physician/provider feels a video visit is not appropriate, you will not be charged for this service.\"    Patient has given verbal consent to a Video visit? Yes  How would you like to obtain your AVS? AVS Preference: MyChart.  If dropped by the video visit, the video invitation should be sent to: Text to cell phone: [phone]  Will anyone else be joining your video visit? Yes: [phone]. How would they like to receive their invitation? Text to cell phone: [phone]          Video-Visit Details    Type of service:  Video Visit    Originating Location (pt. Location): Home    Distant Location (provider location):  [facility]     Platform used for Video Visit: [employer]    This document was created using a software with less than 100% fidelity, at times resulting in unintended, even erroneous syntax and grammar.  The reader is advised to keep this under consideration while reviewing, interpreting this note.    ASSESSMENT AND PLAN:    Diagnoses and all orders for this visit:    Seronegative rheumatoid arthritis (H)  -     methotrexate 2.5 MG tablet; Take 6 tablets (15 mg total) by mouth once a week.  " Dispense: 72 tablet; Refill: 0  -     sulfaSALAzine (AZULFIDINE) 500 mg tablet; Take 1 tablet (500 mg total) by mouth 2 (two) times a day.  Dispense: 120 tablet; Refill: 0    Polymyalgia rheumatica (H)    Primary osteoarthritis involving multiple joints    High risk medication use    Polyarthralgia  -     sulfaSALAzine (AZULFIDINE) 500 mg tablet; Take 1 tablet (500 mg total) by mouth 2 (two) times a day.  Dispense: 120 tablet; Refill: 0          HISTORY OF PRESENTING ILLNESS:  Carissa Mercado 89 y.o. is evaluated here via phone  Link for follow-up of seronegative rheumatoid arthritis, osteoarthritis.  Doing great on methotrexate 20 mg/week, hydroxychloroquine 211 twice daily, sulfasalazine 5 mg twice daily folic acid.  Recent labs within acceptable range.  She reports no recurrence of her joint symptoms in the hands wrists elbows shoulders she has had low back pain.  Her son was with her on today's video call.   There is no family history of psoriasis ulcerative colitis, Crohn's disease rheumatoid arthritis or lupus.  She lives by herself in an apartment with great neighbors.  She is to follow-up here in 3 months.       ROS enquiry held for fever, ocular symptoms, rash, headache,  GI issues.  Today we also discussed the issues related to the current pandemic, the pros and cons of the current treatment plan, the CDC guidelines such as social distancing washing the hands covering the cough.  ALLERGIES:Digoxin    PAST MEDICAL/ACTIVE PROBLEMS/MEDICATION/SOCIAL DATA  Past Medical History:   Diagnosis Date     Dyslipidemia      Hypertension      Social History     Tobacco Use   Smoking Status Former Smoker     Years: 4.00     Types: Cigarettes   Smokeless Tobacco Never Used   Tobacco Comment    Quit 30 years ago     Patient Active Problem List   Diagnosis     Polymyalgia rheumatica (H)     Essential hypertension, benign     Dyslipidemia     Acute cystitis without hematuria     Polyarthralgia     Primary osteoarthritis  involving multiple joints     Elevated sed rate     Seronegative rheumatoid arthritis (H)     Carpal tunnel syndrome, bilateral     High risk medication use     Current Outpatient Medications   Medication Sig Dispense Refill     acetaminophen (TYLENOL) 650 MG CR tablet Take 650 mg by mouth every 8 (eight) hours as needed.       carvedilol (COREG) 25 MG tablet Take 25 mg by mouth 2 (two) times a day with meals.       cyanocobalamin, vitamin B-12, (VITAMIN B12 ORAL) Take by mouth.       furosemide (LASIX) 20 MG tablet Take 20 mg by mouth daily.       hydrOXYchloroQUINE (PLAQUENIL) 200 mg tablet Take 1 tablet (200 mg total) by mouth 2 (two) times a day. 180 tablet 0     lisinopril (PRINIVIL,ZESTRIL) 20 MG tablet Take 20 mg by mouth daily.       methotrexate 2.5 MG tablet TAKE 8 TABLETS BY MOUTH ONCE A WEEK 64 tablet 0     simvastatin (ZOCOR) 20 MG tablet Take 20 mg by mouth daily.       folic acid (FOLVITE) 1 MG tablet Take 1 tablet (1 mg total) by mouth daily. 90 tablet 0     sulfaSALAzine (AZULFIDINE) 500 mg tablet Take 1 tablet (500 mg total) by mouth 2 (two) times a day. 120 tablet 0     No current facility-administered medications for this visit.          EXAMINATION:    Using the audio and video link as best as possible the constitutional, neck, neurologic, psych, skin, both upper extremities areas/organ system were evaluated during this assessment.  Some of the important findings: Alert, oriented, speech fluent.   Able to fully flex the digits, into fists bilaterally, wrist and  elbow range of motion appear normal, abduction of the shoulder is normal.      LAB / IMAGING DATA:  ALT   Date Value Ref Range Status   12/11/2020 12 0 - 45 U/L Final   08/24/2020 14 0 - 45 U/L Final   05/22/2020 12 0 - 45 U/L Final     Albumin   Date Value Ref Range Status   12/11/2020 3.9 3.5 - 5.0 g/dL Final   08/24/2020 3.8 3.5 - 5.0 g/dL Final   05/22/2020 4.1 3.5 - 5.0 g/dL Final     Creatinine   Date Value Ref Range Status    12/11/2020 0.84 0.60 - 1.10 mg/dL Final   08/24/2020 0.78 0.60 - 1.10 mg/dL Final   05/22/2020 0.91 0.60 - 1.10 mg/dL Final       WBC   Date Value Ref Range Status   12/11/2020 4.4 4.0 - 11.0 thou/uL Final   08/24/2020 5.4 4.0 - 11.0 thou/uL Final     Hemoglobin   Date Value Ref Range Status   12/11/2020 10.9 (L) 12.0 - 16.0 g/dL Final   08/24/2020 11.5 (L) 12.0 - 16.0 g/dL Final   05/22/2020 11.2 (L) 12.0 - 16.0 g/dL Final     Platelets   Date Value Ref Range Status   12/11/2020 245 140 - 440 thou/uL Final   08/24/2020 268 140 - 440 thou/uL Final   05/22/2020 269 140 - 440 thou/uL Final       Lab Results   Component Value Date    RF <15.0 11/23/2018    SEDRATE 40 (H) 01/03/2019     Duration of the call:8  Minutes  Call start: 355  pm  Call end:   403pm  More than 1 calls.

## 2021-06-13 NOTE — PROGRESS NOTES
1st attempt- via [employer] phone [phone]   [facility]. Will try again later     Kimber Karimi CMA [facility] 12/14/2020 2:40 PM

## 2021-06-14 ENCOUNTER — AMBULATORY - HEALTHEAST (OUTPATIENT)
Dept: LAB | Facility: CLINIC | Age: 86
End: 2021-06-14

## 2021-06-14 DIAGNOSIS — M06.00 SERONEGATIVE RHEUMATOID ARTHRITIS (H): ICD-10-CM

## 2021-06-14 LAB
ALBUMIN SERPL-MCNC: 4 G/DL (ref 3.5–5)
ALT SERPL W P-5'-P-CCNC: 12 U/L (ref 0–45)
CREAT SERPL-MCNC: 0.8 MG/DL (ref 0.6–1.1)
ERYTHROCYTE [DISTWIDTH] IN BLOOD BY AUTOMATED COUNT: 14 % (ref 11–14.5)
GFR SERPL CREATININE-BSD FRML MDRD: >60 ML/MIN/1.73M2
HCT VFR BLD AUTO: 32.2 % (ref 35–47)
HGB BLD-MCNC: 10.4 G/DL (ref 12–16)
MCH RBC QN AUTO: 31.9 PG (ref 27–34)
MCHC RBC AUTO-ENTMCNC: 32.3 G/DL (ref 32–36)
MCV RBC AUTO: 99 FL (ref 80–100)
PLATELET # BLD AUTO: 240 THOU/UL (ref 140–440)
PMV BLD AUTO: 9.1 FL (ref 7–10)
RBC # BLD AUTO: 3.26 MILL/UL (ref 3.8–5.4)
WBC: 4.8 THOU/UL (ref 4–11)

## 2021-06-16 PROBLEM — E78.5 DYSLIPIDEMIA: Status: ACTIVE | Noted: 2018-11-27

## 2021-06-16 PROBLEM — N30.00 ACUTE CYSTITIS WITHOUT HEMATURIA: Status: ACTIVE | Noted: 2018-11-27

## 2021-06-16 PROBLEM — Z79.899 HIGH RISK MEDICATION USE: Chronic | Status: ACTIVE | Noted: 2020-05-26

## 2021-06-16 PROBLEM — R70.0 ELEVATED SED RATE: Status: ACTIVE | Noted: 2018-12-05

## 2021-06-16 PROBLEM — G56.03 CARPAL TUNNEL SYNDROME, BILATERAL: Status: ACTIVE | Noted: 2019-11-19

## 2021-06-16 PROBLEM — M15.0 PRIMARY OSTEOARTHRITIS INVOLVING MULTIPLE JOINTS: Status: ACTIVE | Noted: 2018-12-05

## 2021-06-16 PROBLEM — M35.3 POLYMYALGIA RHEUMATICA (H): Status: ACTIVE | Noted: 2018-11-27

## 2021-06-16 PROBLEM — I10 ESSENTIAL HYPERTENSION, BENIGN: Status: ACTIVE | Noted: 2018-11-27

## 2021-06-16 PROBLEM — M25.50 POLYARTHRALGIA: Status: ACTIVE | Noted: 2018-12-05

## 2021-06-16 PROBLEM — M06.00 SERONEGATIVE RHEUMATOID ARTHRITIS (H): Status: ACTIVE | Noted: 2019-04-18

## 2021-06-16 NOTE — PROGRESS NOTES
"Carissa Mercado is a 89 y.o. female who is being evaluated via a video call.  What phone number would you like to be contacted at? [phone]  How would you like to obtain your AVS? No copy wanted  video call duration: 12 minutes  Video call start 4:04 PM, video call end 12 minutes later.  Doximity used for the video link.    This document was created using a software with less than 100% fidelity, at times resulting in unintended, even erroneous syntax and grammar.  The reader is advised to keep this under consideration while reviewing, interpreting this note.           ASSESSMENT AND PLAN:    Diagnoses and all orders for this visit:    Seronegative rheumatoid arthritis (H)  -     methotrexate 2.5 MG tablet; Take 6 tablets (15 mg total) by mouth once a week.  Dispense: 72 tablet; Refill: 0  -     folic acid (FOLVITE) 1 MG tablet; Take 1 tablet (1 mg total) by mouth daily.  Dispense: 90 tablet; Refill: 0  -     sulfaSALAzine (AZULFIDINE) 500 mg tablet; Take 1 tablet (500 mg total) by mouth 2 (two) times a day.  Dispense: 180 tablet; Refill: 0  -     hydrOXYchloroQUINE (PLAQUENIL) 200 mg tablet; Take 1 tablet (200 mg total) by mouth 2 (two) times a day.  Dispense: 180 tablet; Refill: 0    Polyarthralgia  -     sulfaSALAzine (AZULFIDINE) 500 mg tablet; Take 1 tablet (500 mg total) by mouth 2 (two) times a day.  Dispense: 180 tablet; Refill: 0        Follow up in 3 months      HISTORY OF PRESENTING ILLNESS:  Carissa Mercado 89 y.o. is evaluated here via video link.  This patient is seen here via her son's cell phone who facilitated with the connection.  She is here for follow-up.  She has rheumatoid arthritis on triple regimen, osteoarthritis.  She reports pain in her hands especially first thing in the morning where she has experienced stiffness, she takes Tylenol which \"usually\" helps.  There is no swelling.  It appears that she is no longer taking hydroxychloroquine as she might have forgotten it once she ran out " of one of the more recent refills..   There is no family history of psoriasis ulcerative colitis, Crohn's disease rheumatoid arthritis or lupus.  She lives by herself in an apartment with great neighbors who are very helpful for her.  She is to follow-up here in 3 months.         ROS enquiry held for fever, ocular symptoms, rash, headache,  GI issues.  Today we also discussed the issues related to the current pandemic, the pros and cons of the current treatment plan, the CDC guidelines such as social distancing washing the hands covering the cough.  ALLERGIES:Digoxin    PAST MEDICAL/ACTIVE PROBLEMS/MEDICATION/SOCIAL DATA  Past Medical History:   Diagnosis Date     Dyslipidemia      Hypertension      Social History     Tobacco Use   Smoking Status Former Smoker     Years: 4.00     Types: Cigarettes   Smokeless Tobacco Never Used   Tobacco Comment    Quit 30 years ago     Patient Active Problem List   Diagnosis     Polymyalgia rheumatica (H)     Essential hypertension, benign     Dyslipidemia     Acute cystitis without hematuria     Polyarthralgia     Primary osteoarthritis involving multiple joints     Elevated sed rate     Seronegative rheumatoid arthritis (H)     Carpal tunnel syndrome, bilateral     High risk medication use     Current Outpatient Medications   Medication Sig Dispense Refill     acetaminophen (TYLENOL) 650 MG CR tablet Take 650 mg by mouth every 8 (eight) hours as needed.       carvedilol (COREG) 25 MG tablet Take 25 mg by mouth 2 (two) times a day with meals.       cyanocobalamin, vitamin B-12, (VITAMIN B12 ORAL) Take by mouth.       folic acid (FOLVITE) 1 MG tablet Take 1 tablet (1 mg total) by mouth daily. 90 tablet 0     furosemide (LASIX) 20 MG tablet Take 20 mg by mouth daily.       hydrOXYchloroQUINE (PLAQUENIL) 200 mg tablet Take 1 tablet (200 mg total) by mouth 2 (two) times a day. 180 tablet 0     lisinopril (PRINIVIL,ZESTRIL) 20 MG tablet Take 20 mg by mouth daily.       methotrexate 2.5  MG tablet Take 6 tablets (15 mg total) by mouth once a week. 72 tablet 0     simvastatin (ZOCOR) 20 MG tablet Take 20 mg by mouth daily.       sulfaSALAzine (AZULFIDINE) 500 mg tablet Take 1 tablet (500 mg total) by mouth 2 (two) times a day. 120 tablet 0     No current facility-administered medications for this visit.          EXAMINATION:    Using the audio and video link as best as possible the constitutional, neck, neurologic, psych, skin, both upper extremities areas/organ system were evaluated during this assessment.  Some of the important findings: Alert, oriented, speech fluent.    Able to fully flex the digits, into fists bilaterally, wrist and elbow range of motion appear normal, her abduction of the  shoulders is limited to no more than 90 degrees on both sides..      LAB / IMAGING DATA:  ALT   Date Value Ref Range Status   03/15/2021 38 0 - 45 U/L Final   02/11/2021 18 0 - 45 U/L Final   12/11/2020 12 0 - 45 U/L Final     Albumin   Date Value Ref Range Status   03/15/2021 4.1 3.5 - 5.0 g/dL Final   02/11/2021 4.2 3.5 - 5.0 g/dL Final   12/11/2020 3.9 3.5 - 5.0 g/dL Final     Creatinine   Date Value Ref Range Status   03/15/2021 0.80 0.60 - 1.10 mg/dL Final   02/11/2021 0.84 0.60 - 1.10 mg/dL Final   12/11/2020 0.84 0.60 - 1.10 mg/dL Final       WBC   Date Value Ref Range Status   03/15/2021 4.4 4.0 - 11.0 thou/uL Final   12/11/2020 4.4 4.0 - 11.0 thou/uL Final     Hemoglobin   Date Value Ref Range Status   03/15/2021 11.0 (L) 12.0 - 16.0 g/dL Final   12/11/2020 10.9 (L) 12.0 - 16.0 g/dL Final   08/24/2020 11.5 (L) 12.0 - 16.0 g/dL Final     Platelets   Date Value Ref Range Status   03/15/2021 228 140 - 440 thou/uL Final   12/11/2020 245 140 - 440 thou/uL Final   08/24/2020 078 [id number] - 739 [facility]/ Final       Lab Results   Component Value Date    RF <15.0 11/23/2018    SEDRATE 40 (H) 01/03/2019

## 2021-06-17 ENCOUNTER — OFFICE VISIT - HEALTHEAST (OUTPATIENT)
Dept: RHEUMATOLOGY | Facility: CLINIC | Age: 86
End: 2021-06-17

## 2021-06-17 DIAGNOSIS — M25.50 POLYARTHRALGIA: ICD-10-CM

## 2021-06-17 DIAGNOSIS — Z79.899 HIGH RISK MEDICATION USE: ICD-10-CM

## 2021-06-17 DIAGNOSIS — M06.00 SERONEGATIVE RHEUMATOID ARTHRITIS (H): ICD-10-CM

## 2021-06-17 DIAGNOSIS — M15.0 PRIMARY OSTEOARTHRITIS INVOLVING MULTIPLE JOINTS: ICD-10-CM

## 2021-06-17 RX ORDER — SULFASALAZINE 500 MG/1
500 TABLET ORAL 2 TIMES DAILY
Qty: 180 TABLET | Refills: 0 | Status: SHIPPED | OUTPATIENT
Start: 2021-06-17 | End: 2022-01-01

## 2021-06-17 RX ORDER — HYDROXYCHLOROQUINE SULFATE 200 MG/1
200 TABLET, FILM COATED ORAL 2 TIMES DAILY
Qty: 180 TABLET | Refills: 0 | Status: SHIPPED | OUTPATIENT
Start: 2021-06-17 | End: 2021-12-23

## 2021-06-22 NOTE — PROGRESS NOTES
"ASSESSMENT AND PLAN:  Carissa Mercado 88 y.o. female is seen here on 01/03/19 for follow-up of systemic inflammatory syndrome, very many features of polymyalgia rheumatica, cannot at this point rule out seronegative rheumatoid arthritis, also complaining of her right hand pain and numbness and tingling especially nighttime, consistent with a carpal tunnel syndrome, she would like to proceed with local injection, 40 mg of methylprednisolone injected under ultrasound guidance into the right carpal tunnel area she tolerated the procedure well.  I have asked her to begin to taper prednisone as ordered.  Follow-up in 2 months.     Diagnoses and all orders for this visit:    Polymyalgia rheumatica (H)  -     predniSONE (DELTASONE) 5 MG tablet  Dispense: 108 tablet; Refill: 0  -     C-Reactive Protein  -     Erythrocyte Sedimentation Rate    Primary osteoarthritis involving multiple joints    Elevated sed rate  -     C-Reactive Protein  -     Erythrocyte Sedimentation Rate    Polyarthralgia  -     methylPREDNISolone acetate injection 40 mg (DEPO-MEDROL)  -     predniSONE (DELTASONE) 5 MG tablet  Dispense: 108 tablet; Refill: 0  -     C-Reactive Protein  -     Erythrocyte Sedimentation Rate    Right carpal tunnel syndrome  -     methylPREDNISolone acetate injection 40 mg (DEPO-MEDROL)      HISTORY OF PRESENTING ILLNESS:  Carissa Mercado, 88 y.o., female is here for follow-up.  She has a systemic inflammatory syndrome.  Many features of polymyalgia rheumatica, significant peripheral inflammatory disease, raising the specter of seronegative rheumatoid arthritis.  She is complaining of right hand numbness tingling.  This is especially in her thumb in the fingertips of the index and middle.  This is gone on for the past several weeks.  Wakes her up at night.  Moderately severe.  She ends up shaking her hand to make it better.  As noted previously her recent hospitalization resulted from her being  \"immobile\" because of the pain " in her shoulders hips and knees ankles, right wrist was swollen.  She recalls that the pain level was 10/10.  It is hard for her to remember how it all began.  This patient was unable if at the beginning this was a sudden onset of issue or started one area or the other.  All she remembers is that she just could not do anything until she was admitted to the hospital.  Her sed rate was 84.  Her YAIMA anti-CCP antibody rheumatoid factor and ANCA were negative.  She denies headaches jaw claudication double vision.  She does not smoke does not take alcohol.  She has hypertension, she was able to recall that her cardiac issue included cardiomyopathy and ejection fraction had gone down to 15 and is now at 45.  Finally she ended up in the hospital.  Her daughter remembers that they gave her morphine they did not touch her pain.  Then they gave her prednisone.  She remembers that it almost took an hour before she started to get better.  Now she is back to her normal self.  She is on 40 mg of prednisone.  During the hospitalization her sed rate was elevated her anti-CCP antibody normal.  There is no family history of psoriasis ulcerative colitis, Crohn's disease rheumatoid arthritis or lupus.  She lives by herself in an apartment with great neighbors.  This.  Further historical information and ADL limitations as noted in the multidimensional health assessment questionnaire attached in the EMR.       ALLERGIES:Digoxin    PAST MEDICAL/ACTIVE PROBLEMS/MEDICATION/ FAMILY HISTORY/SOCIAL DATA:  The patient has a family history of  Past Medical History:   Diagnosis Date     Dyslipidemia      Hypertension      Social History     Tobacco Use   Smoking Status Former Smoker     Years: 4.00     Types: Cigarettes   Smokeless Tobacco Never Used   Tobacco Comment    Quit 30 years ago     Patient Active Problem List   Diagnosis     Polymyalgia rheumatica (H)     Essential hypertension, benign     Dyslipidemia     Acute cystitis without  hematuria     Polyarthralgia     Primary osteoarthritis involving multiple joints     Elevated sed rate     Current Outpatient Medications   Medication Sig Dispense Refill     aspirin 81 MG EC tablet Take 81 mg by mouth daily.       carvedilol (COREG) 25 MG tablet Take 25 mg by mouth 2 (two) times a day with meals.       furosemide (LASIX) 20 MG tablet Take 20 mg by mouth daily.       gabapentin (NEURONTIN) 100 MG capsule Take 200 mg by mouth 3 (three) times a day as needed (nerve pain in arm). 90 capsule 0     HYDROcodone-acetaminophen 5-325 mg per tablet Take 1 tablet by mouth every 4 (four) hours as needed (severe pain.  do not exceed 4,000mg of tylenol in a 24 hour period). 20 tablet 0     lisinopril (PRINIVIL,ZESTRIL) 20 MG tablet Take 20 mg by mouth daily.       predniSONE (DELTASONE) 10 mg tablet Take 4 tabs daily for 7 days, then 3 tabs daily for 3 days, then 2 tabs daily for 3 days, then 1 tab daily for 3 days.. 46 tablet 0     predniSONE (DELTASONE) 10 mg tablet Take 15 mg by mouth daily. 45 tablet 0     simvastatin (ZOCOR) 20 MG tablet Take 20 mg by mouth daily.       No current facility-administered medications for this visit.      DETAILED EXAMINATION  01/03/19  :  Vitals:    01/03/19 0854   BP: 124/68   Patient Site: Right Arm   Patient Position: Sitting   Cuff Size: Adult Regular   Pulse: 72   Weight: 181 lb (82.1 kg)     Alert oriented. Head including the face is examined for malar rash, heliotropes, scarring, lupus pernio. Eyes examined for redness such as in episcleritis/scleritis, periorbital lesions.   Neck/ Face examined for parotid gland swelling, range of motion of neck.  Left upper and lower and right upper and lower extremities examined for tenderness, swelling, warmth of the appendicular joints, range of motion, edema, rash.  Some of the important findings included: Markedly positive Tinel's sign on the right carpal region.  She does not have synovitis in the palpable upper extremity  joints.  No joint line tenderness in the knees bilaterally without effusion or off.   LAB / IMAGING DATA:  ALT   Date Value Ref Range Status   04/13/2018 13 0 - 45 U/L Final   10/13/2017 12 0 - 45 U/L Final   04/14/2017 9 0 - 45 U/L Final     Albumin   Date Value Ref Range Status   04/13/2018 3.5 3.5 - 5.0 g/dL Final   10/13/2017 3.6 3.5 - 5.0 g/dL Final   04/14/2017 3.6 3.5 - 5.0 g/dL Final     Creatinine   Date Value Ref Range Status   11/27/2018 0.90 0.60 - 1.10 mg/dL Final   11/17/2018 0.83 0.60 - 1.10 mg/dL Final   04/13/2018 0.83 0.60 - 1.10 mg/dL Final       WBC   Date Value Ref Range Status   11/27/2018 14.7 (H) 4.0 - 11.0 thou/uL Final     Hemoglobin   Date Value Ref Range Status   11/27/2018 11.8 (L) 12.0 - 16.0 g/dL Final     Platelets   Date Value Ref Range Status   11/29/2018 349 140 - 440 thou/uL Final   11/27/2018 340 140 - 440 thou/uL Final       Lab Results   Component Value Date    RF <15.0 11/23/2018    SEDRATE 38 (H) 12/05/2018

## 2021-06-22 NOTE — PROGRESS NOTES
"ASSESSMENT AND PLAN:  Carissa Mercado 88 y.o. female is seen here on 12/05/18 for evaluation of of severe painful joints with swelling, elevated sed rate 84, negative serologies for YAIMA and ANCA, with a very dramatic response to prednisone, \"within an hour\", without headaches jaw claudication double vision, but several features had therefore consistent with a systemic inflammatory syndrome akin to polymyalgia rheumatica.  There is some limitations as such as the lack of historical information as to how it began.  We also do not know how to evolved prior to October 2018 when she had become nearly totally immobilized.  At this juncture there are several options we have.  We did not discuss those.  We agreed on the following plan amongst those.  #1 she will reduce her prednisone down to 15 mg daily.  She is aware that this could precipitate her original syndrome.  In which case she will then go to higher dose after connecting with us here on the phone or in person.  Should her symptoms remain as well-controlled as now she will stay on 15 mg for the next 30-45 days and return for follow-up thereafter.  Did check her sed rate and CRP.  Diagnoses and all orders for this visit:    Polymyalgia rheumatica (H)  -     predniSONE (DELTASONE) 10 mg tablet; Take 15 mg by mouth daily.  Dispense: 45 tablet; Refill: 0  -     Erythrocyte Sedimentation Rate  -     C-Reactive Protein    Polyarthralgia    Primary osteoarthritis involving multiple joints    Elevated sed rate      HISTORY OF PRESENTING ILLNESS:  Carissa Mercado, 88 y.o., female is here for evaluation of painful joints.  She is accompanied by her daughter B.  It appears that Danielle had become so much limited in day-to-day activities that she ended up in the hospital.  It would appear that, given the limitations of history from her, that during her wedding in mid October she needed to be lifted out of her car for her to be able to attend.  She was \"immobile\" because of " the pain in her shoulders hips and knees ankles, right wrist was swollen.  She recalls that the pain level was 10/10.  It is hard for her to remember how it all began.  This patient was unable if at the beginning this was a sudden onset of issue or started one area or the other.  All she remembers is that she just could not do anything until she was admitted to the hospital.  Her sed rate was 84.  Her YAIMA anti-CCP antibody rheumatoid factor and ANCA were negative.  She denies headaches jaw claudication double vision.  She does not smoke does not take alcohol.  She has hypertension, she was able to recall that her cardiac issue included cardiomyopathy and ejection fraction had gone down to 15 and is now at 45.  Finally she ended up in the hospital.  Her daughter remembers that they gave her morphine they did not touch her pain.  Then they gave her prednisone.  She remembers that it almost took an hour before she started to get better.  Now she is back to her normal self.  She is on 40 mg of prednisone.  During the hospitalization her sed rate was elevated her anti-CCP antibody normal.  There is no family history of psoriasis ulcerative colitis, Crohn's disease rheumatoid arthritis or lupus.  She lives by herself in an apartment with great neighbors.  This.  Further historical information and ADL limitations as noted in the multidimensional health assessment questionnaire attached in the EMR. Rest of the 13 system ROS is negative.     ALLERGIES:Digoxin    PAST MEDICAL/ACTIVE PROBLEMS/MEDICATION/ FAMILY HISTORY/SOCIAL DATA:  The patient has a family history of  Past Medical History:   Diagnosis Date     Dyslipidemia      Hypertension      Social History     Tobacco Use   Smoking Status Former Smoker     Years: 4.00     Types: Cigarettes   Smokeless Tobacco Never Used   Tobacco Comment    Quit 30 years ago     Patient Active Problem List   Diagnosis     Polymyalgia rheumatica (H)     Essential hypertension, benign      "Dyslipidemia     Acute cystitis without hematuria     Current Outpatient Medications   Medication Sig Dispense Refill     aspirin 81 MG EC tablet Take 81 mg by mouth daily.       carvedilol (COREG) 25 MG tablet Take 25 mg by mouth 2 (two) times a day with meals.       furosemide (LASIX) 20 MG tablet Take 20 mg by mouth daily.       gabapentin (NEURONTIN) 100 MG capsule Take 200 mg by mouth 3 (three) times a day as needed (nerve pain in arm). 90 capsule 0     lisinopril (PRINIVIL,ZESTRIL) 20 MG tablet Take 20 mg by mouth daily.       predniSONE (DELTASONE) 10 mg tablet Take 4 tabs daily for 7 days, then 3 tabs daily for 3 days, then 2 tabs daily for 3 days, then 1 tab daily for 3 days.. 46 tablet 0     HYDROcodone-acetaminophen 5-325 mg per tablet Take 1 tablet by mouth every 4 (four) hours as needed (severe pain.  do not exceed 4,000mg of tylenol in a 24 hour period). 20 tablet 0     simvastatin (ZOCOR) 20 MG tablet Take 20 mg by mouth daily.       No current facility-administered medications for this visit.        COMPREHENSIVE EXAMINATION:  Vitals:    12/05/18 1503   BP: 120/68   Patient Site: Right Arm   Patient Position: Sitting   Cuff Size: Adult Large   Pulse: 60   Weight: 181 lb (82.1 kg)   Height: 5' 5\" (1.651 m)     A well appearing alert oriented female. Vital data as noted above. Her eyes without inflammation/scleromalacia. ENTwithout oral mucositis, thrush, nasal deformity, external ear redness, deformity. Her neck is without lymphadenopathy and supple. Lungs normal sounds, no pleural rub. Heart auscultation normal rate, rhythm; no pericardial rub and murmurs. Abdomen soft, non tender, no organomegaly. Skin examined for heliotrope, malar area eruption, lupus pernio, periungual erythema, sclerodactyly, papules, erythema nodosum, purpura, nail pitting, onycholysis, and obvious psoriasis lesion. Neurological examination shows normal alertness, speech, facial symmetry, tone and power in upper and lower " extremities, Tinel's and Phalen's at wrist and gait. The joint examination is performed for swelling, tenderness, warmth, erythema, and range of motion in the following joints: DIPs, PIPs, MCPs, wrists, first CMC's, elbows, shoulders, hips, knees, ankles, feet; spine for range of motion and paraspinal muscles for tenderness. The salient normal / abnormal findings are appended.  She does not have synovitis in any of the palpable joints of upper and lower extremities.  She has swelling along the left ankle which is edema rather than synovitis appearing.  She does not have dactylitis of the digits of toes.  She has remarkably intact range of motion of the shoulders.  She is tender in the joint line of the knees.  She does not have effusions there.  She has tenderness in the trochanteric areas.    LAB / IMAGING DATA:  ALT   Date Value Ref Range Status   04/13/2018 13 0 - 45 U/L Final   10/13/2017 12 0 - 45 U/L Final   04/14/2017 9 0 - 45 U/L Final     Albumin   Date Value Ref Range Status   04/13/2018 3.5 3.5 - 5.0 g/dL Final   10/13/2017 3.6 3.5 - 5.0 g/dL Final   04/14/2017 3.6 3.5 - 5.0 g/dL Final     Creatinine   Date Value Ref Range Status   11/27/2018 0.90 0.60 - 1.10 mg/dL Final   11/17/2018 0.83 0.60 - 1.10 mg/dL Final   04/13/2018 0.83 0.60 - 1.10 mg/dL Final       WBC   Date Value Ref Range Status   11/27/2018 14.7 (H) 4.0 - 11.0 thou/uL Final     Hemoglobin   Date Value Ref Range Status   11/27/2018 11.8 (L) 12.0 - 16.0 g/dL Final     Platelets   Date Value Ref Range Status   11/29/2018 349 140 - 440 thou/uL Final   11/27/2018 340 140 - 440 thou/uL Final       Lab Results   Component Value Date    RF <15.0 11/23/2018    SEDRATE 84 (H) 11/27/2018

## 2021-06-24 NOTE — PROGRESS NOTES
"ASSESSMENT AND PLAN:  Carissa Mercado 88 y.o. female is seen here on 02/19/19 for follow-up of systemic inflammatory syndrome, very many features of polymyalgia rheumatica, Amoxil differential seronegative rheumatoid arthritis, she has noted increased stiffness, her sed rate has plateaued at 40 after she we discussed options such as adding methotrexate.  She would rather increase the prednisone for a short while and then taper more gradually.  She is going to go to 10 mg daily.  This will be for 1 month and then go down to 9mg and drop by 1 mg every 2 weeks.  We will meet here in 2 months with labs just prior.       Diagnoses and all orders for this visit:    Polymyalgia rheumatica (H)  -     predniSONE (DELTASONE) 1 MG tablet  Dispense: 300 tablet; Refill: 1    Polyarthralgia  -     predniSONE (DELTASONE) 1 MG tablet  Dispense: 300 tablet; Refill: 1    Primary osteoarthritis involving multiple joints      HISTORY OF PRESENTING ILLNESS:  Carissa Mercado, 88 y.o., female is here for follow-up.  She has a systemic inflammatory syndrome.  Many features of polymyalgia rheumatica, significant peripheral inflammatory disease, raising the specter of seronegative rheumatoid arthritis.  She has noted stiffness in the morning that can last 3 hours.  She has felt more tired.  Her appetite is not as good.  She has difficulty sleeping.  She rated pain level.  Sed rate 40. .  As noted previously her recent hospitalization resulted from her being  \"immobile\" because of the pain in her shoulders hips and knees ankles, right wrist was swollen.  She recalls that the pain level was 10/10.  It is hard for her to remember how it all began.  This patient was unable if at the beginning this was a sudden onset of issue or started one area or the other.  All she remembers is that she just could not do anything until she was admitted to the hospital.  Her sed rate was 84.  Her YAIMA anti-CCP antibody rheumatoid factor and ANCA were negative.  " She denies headaches jaw claudication double vision.  She does not smoke does not take alcohol.  She has hypertension, she was able to recall that her cardiac issue included cardiomyopathy and ejection fraction had gone down to 15 and is now at 45.  Finally she ended up in the hospital.  Her daughter remembers that they gave her morphine they did not touch her pain.  Then they gave her prednisone.  She remembers that it almost took an hour before she started to get better.  Now she is back to her normal self.  She is on 40 mg of prednisone.  During the hospitalization her sed rate was elevated her anti-CCP antibody normal.  There is no family history of psoriasis ulcerative colitis, Crohn's disease rheumatoid arthritis or lupus.  She lives by herself in an apartment with great neighbors.  This.  Further historical information and ADL limitations as noted in the multidimensional health assessment questionnaire attached in the EMR.       ALLERGIES:Digoxin    PAST MEDICAL/ACTIVE PROBLEMS/MEDICATION/ FAMILY HISTORY/SOCIAL DATA:  The patient has a family history of  Past Medical History:   Diagnosis Date     Dyslipidemia      Hypertension      Social History     Tobacco Use   Smoking Status Former Smoker     Years: 4.00     Types: Cigarettes   Smokeless Tobacco Never Used   Tobacco Comment    Quit 30 years ago     Patient Active Problem List   Diagnosis     Polymyalgia rheumatica (H)     Essential hypertension, benign     Dyslipidemia     Acute cystitis without hematuria     Polyarthralgia     Primary osteoarthritis involving multiple joints     Elevated sed rate     Right carpal tunnel syndrome     Current Outpatient Medications   Medication Sig Dispense Refill     aspirin 81 MG EC tablet Take 81 mg by mouth daily.       carvedilol (COREG) 25 MG tablet Take 25 mg by mouth 2 (two) times a day with meals.       furosemide (LASIX) 20 MG tablet Take 20 mg by mouth daily.       gabapentin (NEURONTIN) 100 MG capsule Take 200  mg by mouth 3 (three) times a day as needed (nerve pain in arm). 90 capsule 0     HYDROcodone-acetaminophen 5-325 mg per tablet Take 1 tablet by mouth every 4 (four) hours as needed (severe pain.  do not exceed 4,000mg of tylenol in a 24 hour period). 20 tablet 0     lisinopril (PRINIVIL,ZESTRIL) 20 MG tablet Take 20 mg by mouth daily.       predniSONE (DELTASONE) 5 MG tablet Take 12.5 mg by mouth daily for 14 days, THEN 10 mg daily for 14 days, THEN 7.5 mg daily. 108 tablet 0     simvastatin (ZOCOR) 20 MG tablet Take 20 mg by mouth daily.       No current facility-administered medications for this visit.      DETAILED EXAMINATION  02/19/19  :  Vitals:    02/19/19 1539   BP: 122/64   Patient Site: Right Arm   Patient Position: Sitting   Cuff Size: Adult Regular   Pulse: 80   Weight: 181 lb (82.1 kg)     Alert oriented. Head including the face is examined for malar rash, heliotropes, scarring, lupus pernio. Eyes examined for redness such as in episcleritis/scleritis, periorbital lesions.   Neck/ Face examined for parotid gland swelling, range of motion of neck.  Left upper and lower and right upper and lower extremities examined for tenderness, swelling, warmth of the appendicular joints, range of motion, edema, rash.  Some of the important findings included: Impingement both shoulders with painful arc and abduction.  She does not have synovitis in the palpable upper extremity joints.  No joint line tenderness in the knees bilaterally without effusion or off.   LAB / IMAGING DATA:  ALT   Date Value Ref Range Status   04/13/2018 13 0 - 45 U/L Final   10/13/2017 12 0 - 45 U/L Final   04/14/2017 9 0 - 45 U/L Final     Albumin   Date Value Ref Range Status   04/13/2018 3.5 3.5 - 5.0 g/dL Final   10/13/2017 3.6 3.5 - 5.0 g/dL Final   04/14/2017 3.6 3.5 - 5.0 g/dL Final     Creatinine   Date Value Ref Range Status   11/27/2018 0.90 0.60 - 1.10 mg/dL Final   11/17/2018 0.83 0.60 - 1.10 mg/dL Final   04/13/2018 0.83 0.60 -  1.10 mg/dL Final       WBC   Date Value Ref Range Status   11/27/2018 14.7 (H) 4.0 - 11.0 thou/uL Final     Hemoglobin   Date Value Ref Range Status   11/27/2018 11.8 (L) 12.0 - 16.0 g/dL Final     Platelets   Date Value Ref Range Status   11/29/2018 349 140 - 440 thou/uL Final   11/27/2018 340 140 - 440 thou/uL Final       Lab Results   Component Value Date    RF <15.0 11/23/2018    SEDRATE 40 (H) 01/03/2019

## 2021-06-26 NOTE — PROGRESS NOTES
Carissa Mercado is a 89 y.o. female who is being evaluated via a billable video visit.      How would you like to obtain your AVS? Mail a copy.  If dropped from the video visit, the video invitation should be resent by: Text to cell phone: [phone]  Will anyone else be joining your video visit? Yes, Son Sundeep      Video Start Time: 12:26 PM  Video-Visit Details    Type of service:  Video Visit    Video End Time (time video stopped): 12:32 PM  Originating Location (pt. Location): Home    Distant Location (provider location):  [facility]     Platform used for Video Visit: [employer]      This document was created using a software with less than 100% fidelity, at times resulting in unintended, even erroneous syntax and grammar.  The reader is advised to keep this under consideration while reviewing, interpreting this note.           ASSESSMENT AND PLAN:    Diagnoses and all orders for this visit:    Seronegative rheumatoid arthritis (H)  -     sulfaSALAzine (AZULFIDINE) 500 mg tablet; Take 1 tablet (500 mg total) by mouth 2 (two) times a day.  Dispense: 180 tablet; Refill: 0  -     hydrOXYchloroQUINE (PLAQUENIL) 200 mg tablet; Take 1 tablet (200 mg total) by mouth 2 (two) times a day.  Dispense: 180 tablet; Refill: 0    Polyarthralgia  -     sulfaSALAzine (AZULFIDINE) 500 mg tablet; Take 1 tablet (500 mg total) by mouth 2 (two) times a day.  Dispense: 180 tablet; Refill: 0  -     Albumin; Standing  -     ALT (SGPT); Standing  -     Creatinine; Standing  -     HM2(CBC w/o Differential); Standing    Primary osteoarthritis involving multiple joints    High risk medication use  -     Albumin; Standing  -     ALT (SGPT); Standing  -     Creatinine; Standing  -     HM2(CBC w/o Differential); Standing        Follow up in 6 months labs every 3 months      HISTORY OF PRESENTING ILLNESS:  Carissa Mercado 89 y.o. is evaluated here via video/audio link.  This patient is seen here via her son's cell phone  "who facilitated  the conversation.  She is here for follow-up.  She has rheumatoid arthritis on triple regimen, osteoarthritis.    She reports no new joint symptoms.  She feels that \"for a 89-year-old\" I am doing so well.  She does not describe stiffness in the morning beyond the first few minutes.  She has not had a flareup of her joints over the past several months.  Her recent labs are within acceptable range, a little low hemoglobin that has been the case for several checks.  She does not take any additional medications beyond the prescribed once with joint pain such as Tylenol.  She is aware of management principles of osteoarthritis.  There is no family history of psoriasis ulcerative colitis, Crohn's disease rheumatoid arthritis or lupus.  She lives by herself in an apartment with great neighbors who are very helpful for her.  She is to follow-up here in 6 months.  Keep checking labs every 3 months.       ROS enquiry held for fever, ocular symptoms, rash, headache,  GI issues.  Today we also discussed the issues related to the current pandemic, the pros and cons of the current treatment plan, the CDC guidelines such as social distancing washing the hands covering the cough.  ALLERGIES:Digoxin    PAST MEDICAL/ACTIVE PROBLEMS/MEDICATION/SOCIAL DATA  Past Medical History:   Diagnosis Date     Dyslipidemia      Hypertension      Social History     Tobacco Use   Smoking Status Former Smoker     Years: 4.00     Types: Cigarettes   Smokeless Tobacco Never Used   Tobacco Comment    Quit 30 years ago     Patient Active Problem List   Diagnosis     Polymyalgia rheumatica (H)     Essential hypertension, benign     Dyslipidemia     Acute cystitis without hematuria     Polyarthralgia     Primary osteoarthritis involving multiple joints     Elevated sed rate     Seronegative rheumatoid arthritis (H)     Carpal tunnel syndrome, bilateral     High risk medication use     Current Outpatient Medications   Medication Sig " Dispense Refill     acetaminophen (TYLENOL) 650 MG CR tablet Take 650 mg by mouth every 8 (eight) hours as needed.       carvedilol (COREG) 25 MG tablet Take 25 mg by mouth 2 (two) times a day with meals.       folic acid (FOLVITE) 1 MG tablet Take 1 tablet by mouth once daily 90 tablet 0     hydrOXYchloroQUINE (PLAQUENIL) 200 mg tablet Take 1 tablet (200 mg total) by mouth 2 (two) times a day. 180 tablet 0     methotrexate 2.5 MG tablet TAKE 6 TABLETS BY MOUTH ONCE A WEEK 72 tablet 0     simvastatin (ZOCOR) 20 MG tablet Take 20 mg by mouth daily.       sulfaSALAzine (AZULFIDINE) 500 mg tablet Take 1 tablet (500 mg total) by mouth 2 (two) times a day. 180 tablet 0     No current facility-administered medications for this visit.          EXAMINATION:    Using the audio and video link as best as possible the constitutional, neck, neurologic, psych, skin, both upper extremities areas/organ system were evaluated during this assessment.  Some of the important findings: Alert, oriented, speech fluent.    Able to fully flex the digits, into fists bilaterally, wrist and elbow range of motion appear normal, abduction of the shoulder is normal.      LAB / IMAGING DATA:  ALT   Date Value Ref Range Status   06/14/2021 12 0 - 45 U/L Final   03/15/2021 38 0 - 45 U/L Final   02/11/2021 18 0 - 45 U/L Final     Albumin   Date Value Ref Range Status   06/14/2021 4.0 3.5 - 5.0 g/dL Final   03/15/2021 4.1 3.5 - 5.0 g/dL Final   02/11/2021 4.2 3.5 - 5.0 g/dL Final     Creatinine   Date Value Ref Range Status   06/14/2021 0.80 0.60 - 1.10 mg/dL Final   03/15/2021 0.80 0.60 - 1.10 mg/dL Final   02/11/2021 0.84 0.60 - 1.10 mg/dL Final       WBC   Date Value Ref Range Status   06/14/2021 4.8 4.0 - 11.0 thou/uL Final   03/15/2021 4.4 4.0 - 11.0 thou/uL Final     Hemoglobin   Date Value Ref Range Status   06/14/2021 10.4 (L) 12.0 - 16.0 g/dL Final   03/15/2021 11.0 (L) 12.0 - 16.0 g/dL Final   12/11/2020 10.9 (L) 12.0 - 16.0 g/dL Final      Platelets   Date Value Ref Range Status   06/14/2021 240 140 - 440 thou/uL Final   03/15/2021 228 140 - 440 thou/uL Final   12/11/2020 245 140 - 440 thou/uL Final       Lab Results   Component Value Date    RF <15.0 11/23/2018    SEDRATE 40 (H) 01/03/2019

## 2021-07-03 NOTE — ADDENDUM NOTE
Addendum Note by Cristal Vela RN at 4/18/2019  4:40 PM     Author: Cristal Vela RN Service: -- Author Type: Registered Nurse    Filed: 4/19/2019  9:11 AM Encounter Date: 4/18/2019 Status: Signed    : Cristal Vela RN (Registered Nurse)    Addended by: CRISTAL VELA on: 4/19/2019 09:11 AM        Modules accepted: Orders

## 2021-09-17 ENCOUNTER — LAB (OUTPATIENT)
Dept: LAB | Facility: CLINIC | Age: 86
End: 2021-09-17
Payer: MEDICARE

## 2021-09-17 DIAGNOSIS — M25.50 POLYARTHRALGIA: ICD-10-CM

## 2021-09-17 DIAGNOSIS — Z79.899 HIGH RISK MEDICATION USE: ICD-10-CM

## 2021-09-17 LAB
ALBUMIN SERPL-MCNC: 3.9 G/DL (ref 3.5–5)
ALT SERPL W P-5'-P-CCNC: <9 U/L (ref 0–45)
CREAT SERPL-MCNC: 0.87 MG/DL (ref 0.6–1.1)
ERYTHROCYTE [DISTWIDTH] IN BLOOD BY AUTOMATED COUNT: 13.6 % (ref 10–15)
GFR SERPL CREATININE-BSD FRML MDRD: 58 ML/MIN/1.73M2
HCT VFR BLD AUTO: 33.1 % (ref 35–47)
HGB BLD-MCNC: 10.7 G/DL (ref 11.7–15.7)
MCH RBC QN AUTO: 31.5 PG (ref 26.5–33)
MCHC RBC AUTO-ENTMCNC: 32.3 G/DL (ref 31.5–36.5)
MCV RBC AUTO: 97 FL (ref 78–100)
PLATELET # BLD AUTO: 226 10E3/UL (ref 150–450)
RBC # BLD AUTO: 3.4 10E6/UL (ref 3.8–5.2)
WBC # BLD AUTO: 4.7 10E3/UL (ref 4–11)

## 2021-09-17 PROCEDURE — 85027 COMPLETE CBC AUTOMATED: CPT

## 2021-09-17 PROCEDURE — 36415 COLL VENOUS BLD VENIPUNCTURE: CPT

## 2021-09-17 PROCEDURE — 84460 ALANINE AMINO (ALT) (SGPT): CPT

## 2021-09-17 PROCEDURE — 82565 ASSAY OF CREATININE: CPT

## 2021-09-17 PROCEDURE — 82040 ASSAY OF SERUM ALBUMIN: CPT

## 2021-09-25 DIAGNOSIS — M06.00 SERONEGATIVE RHEUMATOID ARTHRITIS (H): ICD-10-CM

## 2021-09-27 RX ORDER — METHOTREXATE 2.5 MG/1
TABLET ORAL
Qty: 72 TABLET | Refills: 0 | Status: SHIPPED | OUTPATIENT
Start: 2021-09-27 | End: 2021-12-23

## 2021-10-15 ENCOUNTER — LAB REQUISITION (OUTPATIENT)
Dept: LAB | Facility: CLINIC | Age: 86
End: 2021-10-15
Payer: MEDICARE

## 2021-10-15 DIAGNOSIS — R63.4 ABNORMAL WEIGHT LOSS: ICD-10-CM

## 2021-10-15 LAB
ALBUMIN SERPL-MCNC: 4 G/DL (ref 3.5–5)
ALP SERPL-CCNC: 51 U/L (ref 45–120)
ALT SERPL W P-5'-P-CCNC: <9 U/L (ref 0–45)
ANION GAP SERPL CALCULATED.3IONS-SCNC: 10 MMOL/L (ref 5–18)
AST SERPL W P-5'-P-CCNC: 23 U/L (ref 0–40)
BILIRUB SERPL-MCNC: 0.6 MG/DL (ref 0–1)
BUN SERPL-MCNC: 14 MG/DL (ref 8–28)
CALCIUM SERPL-MCNC: 9.4 MG/DL (ref 8.5–10.5)
CHLORIDE BLD-SCNC: 106 MMOL/L (ref 98–107)
CO2 SERPL-SCNC: 22 MMOL/L (ref 22–31)
CREAT SERPL-MCNC: 0.88 MG/DL (ref 0.6–1.1)
GFR SERPL CREATININE-BSD FRML MDRD: 58 ML/MIN/1.73M2
GLUCOSE BLD-MCNC: 88 MG/DL (ref 70–125)
POTASSIUM BLD-SCNC: 4.1 MMOL/L (ref 3.5–5)
PROT SERPL-MCNC: 6.4 G/DL (ref 6–8)
SODIUM SERPL-SCNC: 138 MMOL/L (ref 136–145)
TSH SERPL DL<=0.005 MIU/L-ACNC: 3.08 UIU/ML (ref 0.3–5)

## 2021-10-15 PROCEDURE — 80053 COMPREHEN METABOLIC PANEL: CPT | Mod: ORL | Performed by: PHYSICIAN ASSISTANT

## 2021-10-15 PROCEDURE — 84443 ASSAY THYROID STIM HORMONE: CPT | Mod: ORL | Performed by: PHYSICIAN ASSISTANT

## 2021-12-17 ENCOUNTER — LAB (OUTPATIENT)
Dept: LAB | Facility: CLINIC | Age: 86
End: 2021-12-17
Payer: MEDICARE

## 2021-12-17 DIAGNOSIS — Z79.899 HIGH RISK MEDICATION USE: ICD-10-CM

## 2021-12-17 DIAGNOSIS — M25.50 POLYARTHRALGIA: ICD-10-CM

## 2021-12-17 LAB
ALBUMIN SERPL-MCNC: 3.8 G/DL (ref 3.5–5)
ALT SERPL W P-5'-P-CCNC: 14 U/L (ref 0–45)
CREAT SERPL-MCNC: 0.8 MG/DL (ref 0.6–1.1)
ERYTHROCYTE [DISTWIDTH] IN BLOOD BY AUTOMATED COUNT: 14.7 % (ref 10–15)
GFR SERPL CREATININE-BSD FRML MDRD: 65 ML/MIN/1.73M2
HCT VFR BLD AUTO: 34.3 % (ref 35–47)
HGB BLD-MCNC: 11 G/DL (ref 11.7–15.7)
MCH RBC QN AUTO: 31.6 PG (ref 26.5–33)
MCHC RBC AUTO-ENTMCNC: 32.1 G/DL (ref 31.5–36.5)
MCV RBC AUTO: 99 FL (ref 78–100)
PLATELET # BLD AUTO: 230 10E3/UL (ref 150–450)
RBC # BLD AUTO: 3.48 10E6/UL (ref 3.8–5.2)
WBC # BLD AUTO: 5.4 10E3/UL (ref 4–11)

## 2021-12-17 PROCEDURE — 85027 COMPLETE CBC AUTOMATED: CPT

## 2021-12-17 PROCEDURE — 84460 ALANINE AMINO (ALT) (SGPT): CPT

## 2021-12-17 PROCEDURE — 82565 ASSAY OF CREATININE: CPT

## 2021-12-17 PROCEDURE — 36415 COLL VENOUS BLD VENIPUNCTURE: CPT

## 2021-12-17 PROCEDURE — 82040 ASSAY OF SERUM ALBUMIN: CPT

## 2021-12-23 ENCOUNTER — VIRTUAL VISIT (OUTPATIENT)
Dept: RHEUMATOLOGY | Facility: CLINIC | Age: 86
End: 2021-12-23
Payer: MEDICARE

## 2021-12-23 DIAGNOSIS — M25.50 POLYARTHRALGIA: ICD-10-CM

## 2021-12-23 DIAGNOSIS — M06.00 SERONEGATIVE RHEUMATOID ARTHRITIS (H): ICD-10-CM

## 2021-12-23 PROCEDURE — 99214 OFFICE O/P EST MOD 30 MIN: CPT | Mod: 95 | Performed by: INTERNAL MEDICINE

## 2021-12-23 RX ORDER — SULFASALAZINE 500 MG/1
500 TABLET ORAL 2 TIMES DAILY
Qty: 180 TABLET | Refills: 0 | Status: SHIPPED | OUTPATIENT
Start: 2021-12-23 | End: 2022-01-01

## 2021-12-23 RX ORDER — METHOTREXATE 2.5 MG/1
TABLET ORAL
Qty: 72 TABLET | Refills: 0 | Status: SHIPPED | OUTPATIENT
Start: 2021-12-23 | End: 2022-01-01

## 2021-12-23 RX ORDER — FOLIC ACID 1 MG/1
1000 TABLET ORAL DAILY
Qty: 90 TABLET | Refills: 0 | Status: SHIPPED | OUTPATIENT
Start: 2021-12-23 | End: 2022-01-01

## 2021-12-23 RX ORDER — HYDROXYCHLOROQUINE SULFATE 200 MG/1
200 TABLET, FILM COATED ORAL 2 TIMES DAILY
Qty: 180 TABLET | Refills: 0 | Status: SHIPPED | OUTPATIENT
Start: 2021-12-23 | End: 2022-01-01

## 2021-12-23 NOTE — PROGRESS NOTES
Carissa is a 89 year old who is being evaluated via a billable video visit.      How would you like to obtain your AVS? Mail a copy  If the video visit is dropped, the invitation should be resent by: Text to cell phone: [phone]    Will anyone else be joining your video visit? Yes, bay Urbano      Video Start Time: 10:45 AM  Video-Visit Details    Type of service:  Video Visit    Video End Time:10:52 AM    Originating Location (pt. Location): Home    [facility]     Platform used for Video Visit: [employer]       This document was created using a software with less than 100% fidelity, at times resulting in unintended, even erroneous syntax and grammar.  The reader is advised to keep this under consideration while reviewing, interpreting this note.           ASSESSMENT AND PLAN:    Diagnoses and all orders for this visit:  Seronegative rheumatoid arthritis (H)  -     hydroxychloroquine (PLAQUENIL) 200 MG tablet; Take 1 tablet (200 mg) by mouth 2 times daily  -     methotrexate sodium 2.5 MG TABS; TAKE 6 TABLETS BY MOUTH ONCE A WEEK  -     sulfaSALAzine (AZULFIDINE) 500 MG tablet; Take 1 tablet (500 mg) by mouth 2 times daily  -     folic acid (FOLVITE) 1 MG tablet; Take 1 tablet (1,000 mcg) by mouth daily  Polyarthralgia  -     sulfaSALAzine (AZULFIDINE) 500 MG tablet; Take 1 tablet (500 mg) by mouth 2 times daily      Follow up in 6 months      HISTORY OF PRESENTING ILLNESS:  Carissa Mercado 89 year old is evaluated here via video/audio link. This patient is seen here via her son's cell phone who facilitated  the conversation.  She is here for follow-up.  She has rheumatoid arthritis on triple regimen of methotrexate sulfasalazine, hydroxychloroquine on folic acid along with.  She has osteoarthritis.     Apart from her hands being cold, and her right knee and sometimes giving way without significant pain she feels that overall she is doing great.  She has  not obtain any swelling in the joints and allergies as for no more than the first few minutes.  Her recent labs are within acceptable range, a little low hemoglobin that has been the case for several checks.  She does not take any additional medications beyond the prescribed once with joint pain such as Tylenol.  She is aware of management principles of osteoarthritis.  There is no family history of psoriasis ulcerative colitis, Crohn's disease rheumatoid arthritis or lupus.  She lives by herself in an apartment with great neighbors who are very helpful for her.  She is to follow-up here in 6 months.  Keep checking labs every 3 months.             ROS enquiry held for fever, ocular symptoms, rash, headache,  GI issues.  Today we also discussed the issues related to the current pandemic, the pros and cons of the current treatment plan, the CDC guidelines such as social distancing washing the hands covering the cough.  ALLERGIES:Digoxin    PAST MEDICAL/ACTIVE PROBLEMS/MEDICATION/SOCIAL DATA  Past Medical History:   Diagnosis Date     Dyslipidemia      Hypertension      History   Smoking Status     Former Smoker     Years: 4.00     Types: Cigarettes, Cigarettes   Smokeless Tobacco     Never Used     Comment: Quit 30 years ago     Patient Active Problem List   Diagnosis     Polymyalgia rheumatica (H)     Essential hypertension, benign     Dyslipidemia     Acute cystitis without hematuria     Polyarthralgia     Primary osteoarthritis involving multiple joints     Elevated sed rate     Seronegative rheumatoid arthritis (H)     Carpal tunnel syndrome, bilateral     High risk medication use     Current Outpatient Medications   Medication Sig Dispense Refill     acetaminophen (TYLENOL) 650 MG CR tablet [ACETAMINOPHEN (TYLENOL) 650 MG CR TABLET] Take 650 mg by mouth every 8 (eight) hours as needed.       carvedilol (COREG) 25 MG tablet [CARVEDILOL (COREG) 25 MG TABLET] Take 25 mg by mouth 2 (two) times a day with meals.        cyanocobalamin, vitamin B-12, (VITAMIN B12 ORAL) [CYANOCOBALAMIN, VITAMIN B-12, (VITAMIN B12 ORAL)] Take by mouth.       folic acid (FOLVITE) 1 MG tablet Take 1 tablet by mouth once daily 90 tablet 0     furosemide (LASIX) 20 MG tablet [FUROSEMIDE (LASIX) 20 MG TABLET] Take 20 mg by mouth daily.       hydrOXYchloroQUINE (PLAQUENIL) 200 mg tablet [HYDROXYCHLOROQUINE (PLAQUENIL) 200 MG TABLET] Take 1 tablet (200 mg total) by mouth 2 (two) times a day. 180 tablet 0     lisinopril (PRINIVIL,ZESTRIL) 20 MG tablet [LISINOPRIL (PRINIVIL,ZESTRIL) 20 MG TABLET] Take 20 mg by mouth daily.       methotrexate sodium 2.5 MG TABS TAKE 6 TABLETS BY MOUTH ONCE A WEEK 72 tablet 0     simvastatin (ZOCOR) 20 MG tablet [SIMVASTATIN (ZOCOR) 20 MG TABLET] Take 20 mg by mouth daily.       hydrOXYchloroQUINE (PLAQUENIL) 200 mg tablet [HYDROXYCHLOROQUINE (PLAQUENIL) 200 MG TABLET] Take 1 tablet (200 mg total) by mouth 2 (two) times a day. 180 tablet 0     sulfaSALAzine (AZULFIDINE) 500 mg tablet [SULFASALAZINE (AZULFIDINE) 500 MG TABLET] Take 1 tablet (500 mg total) by mouth 2 (two) times a day. 180 tablet 0     sulfaSALAzine (AZULFIDINE) 500 mg tablet [SULFASALAZINE (AZULFIDINE) 500 MG TABLET] Take 1 tablet (500 mg total) by mouth 2 (two) times a day. 180 tablet 0         EXAMINATION:    Using the audio and video link as best as possible the constitutional, neck, neurologic, psych, skin, both upper extremities areas/organ system were evaluated during this assessment.  Some of the important findings: Alert, oriented, speech fluent.    Able to fully flex the digits, into fists bilaterally, wrist and elbow range of motion appear normal, abduction of the shoulder is normal.      LAB / IMAGING DATA:  ALT   Date Value Ref Range Status   12/17/2021 14 0 - 45 U/L Final   10/15/2021 <9 0 - 45 U/L Final   09/17/2021 <9 0 - 45 U/L Final     Albumin   Date Value Ref Range Status   12/17/2021 3.8 3.5 - 5.0 g/dL Final   10/15/2021 4.0 3.5 - 5.0 g/dL  Final   09/17/2021 3.9 3.5 - 5.0 g/dL Final       WBC Count   Date Value Ref Range Status   12/17/2021 5.4 4.0 - 11.0 10e3/uL Final   09/17/2021 4.7 4.0 - 11.0 10e3/uL Final     Hemoglobin   Date Value Ref Range Status   12/17/2021 11.0 (L) 11.7 - 15.7 g/dL Final   09/17/2021 10.7 (L) 11.7 - 15.7 g/dL Final   06/14/2021 10.4 (L) 12.0 - 16.0 g/dL Final     Platelet Count   Date Value Ref Range Status   12/17/2021 230 150 - 450 10e3/uL Final   09/17/2021 226 150 - 450 10e3/uL Final   06/14/2021 240 140 - 440 thou/uL Final       No results found for: YAIMA

## 2022-01-01 ENCOUNTER — LAB (OUTPATIENT)
Dept: LAB | Facility: CLINIC | Age: 87
End: 2022-01-01
Payer: MEDICARE

## 2022-01-01 ENCOUNTER — LAB REQUISITION (OUTPATIENT)
Dept: LAB | Facility: CLINIC | Age: 87
End: 2022-01-01
Payer: MEDICARE

## 2022-01-01 ENCOUNTER — TELEPHONE (OUTPATIENT)
Dept: RHEUMATOLOGY | Facility: CLINIC | Age: 87
End: 2022-01-01

## 2022-01-01 ENCOUNTER — OFFICE VISIT (OUTPATIENT)
Dept: RHEUMATOLOGY | Facility: CLINIC | Age: 87
End: 2022-01-01
Payer: MEDICARE

## 2022-01-01 VITALS
HEIGHT: 65 IN | WEIGHT: 130 LBS | HEART RATE: 76 BPM | DIASTOLIC BLOOD PRESSURE: 64 MMHG | SYSTOLIC BLOOD PRESSURE: 106 MMHG | BODY MASS INDEX: 21.66 KG/M2

## 2022-01-01 VITALS
SYSTOLIC BLOOD PRESSURE: 110 MMHG | HEIGHT: 65 IN | WEIGHT: 123.9 LBS | DIASTOLIC BLOOD PRESSURE: 60 MMHG | BODY MASS INDEX: 20.64 KG/M2 | HEART RATE: 80 BPM

## 2022-01-01 VITALS
HEART RATE: 76 BPM | BODY MASS INDEX: 21.75 KG/M2 | DIASTOLIC BLOOD PRESSURE: 68 MMHG | WEIGHT: 130.7 LBS | SYSTOLIC BLOOD PRESSURE: 120 MMHG

## 2022-01-01 DIAGNOSIS — M06.00 SERONEGATIVE RHEUMATOID ARTHRITIS (H): ICD-10-CM

## 2022-01-01 DIAGNOSIS — M25.50 POLYARTHRALGIA: ICD-10-CM

## 2022-01-01 DIAGNOSIS — M06.00 SERONEGATIVE RHEUMATOID ARTHRITIS (H): Primary | ICD-10-CM

## 2022-01-01 DIAGNOSIS — M65.312 TRIGGER FINGER OF LEFT THUMB: ICD-10-CM

## 2022-01-01 DIAGNOSIS — Z79.899 HIGH RISK MEDICATION USE: ICD-10-CM

## 2022-01-01 DIAGNOSIS — R30.0 DYSURIA: ICD-10-CM

## 2022-01-01 DIAGNOSIS — E78.5 HYPERLIPIDEMIA, UNSPECIFIED: ICD-10-CM

## 2022-01-01 DIAGNOSIS — M15.0 PRIMARY OSTEOARTHRITIS INVOLVING MULTIPLE JOINTS: ICD-10-CM

## 2022-01-01 LAB
ALBUMIN SERPL BCG-MCNC: 3.8 G/DL (ref 3.5–5.2)
ALBUMIN SERPL BCG-MCNC: 4 G/DL (ref 3.5–5.2)
ALBUMIN SERPL BCG-MCNC: 4 G/DL (ref 3.5–5.2)
ALBUMIN SERPL-MCNC: 3.9 G/DL (ref 3.5–5)
ALBUMIN SERPL-MCNC: 3.9 G/DL (ref 3.5–5)
ALP SERPL-CCNC: 81 U/L (ref 35–104)
ALT SERPL W P-5'-P-CCNC: 10 U/L (ref 10–35)
ALT SERPL W P-5'-P-CCNC: 12 U/L (ref 0–45)
ALT SERPL W P-5'-P-CCNC: 13 U/L (ref 10–35)
ALT SERPL W P-5'-P-CCNC: 14 U/L (ref 10–35)
ALT SERPL W P-5'-P-CCNC: 15 U/L (ref 0–45)
ANION GAP SERPL CALCULATED.3IONS-SCNC: 8 MMOL/L (ref 7–15)
AST SERPL W P-5'-P-CCNC: 27 U/L (ref 10–35)
BACTERIA UR CULT: NORMAL
BILIRUB SERPL-MCNC: 0.5 MG/DL
BUN SERPL-MCNC: 18.6 MG/DL (ref 8–23)
CALCIUM SERPL-MCNC: 8.7 MG/DL (ref 8.2–9.6)
CHLORIDE SERPL-SCNC: 101 MMOL/L (ref 98–107)
CHOLEST SERPL-MCNC: 134 MG/DL
CREAT SERPL-MCNC: 0.83 MG/DL (ref 0.51–0.95)
CREAT SERPL-MCNC: 0.83 MG/DL (ref 0.51–0.95)
CREAT SERPL-MCNC: 0.87 MG/DL (ref 0.6–1.1)
CREAT SERPL-MCNC: 0.88 MG/DL (ref 0.6–1.1)
CREAT SERPL-MCNC: 0.92 MG/DL (ref 0.51–0.95)
DEPRECATED HCO3 PLAS-SCNC: 26 MMOL/L (ref 22–29)
ERYTHROCYTE [DISTWIDTH] IN BLOOD BY AUTOMATED COUNT: 14.2 % (ref 10–15)
ERYTHROCYTE [DISTWIDTH] IN BLOOD BY AUTOMATED COUNT: 14.3 % (ref 10–15)
ERYTHROCYTE [DISTWIDTH] IN BLOOD BY AUTOMATED COUNT: 14.5 % (ref 10–15)
ERYTHROCYTE [DISTWIDTH] IN BLOOD BY AUTOMATED COUNT: 14.7 % (ref 10–15)
GFR SERPL CREATININE-BSD FRML MDRD: 58 ML/MIN/1.73M2
GFR SERPL CREATININE-BSD FRML MDRD: 62 ML/MIN/1.73M2
GFR SERPL CREATININE-BSD FRML MDRD: 62 ML/MIN/1.73M2
GFR SERPL CREATININE-BSD FRML MDRD: 66 ML/MIN/1.73M2
GFR SERPL CREATININE-BSD FRML MDRD: 66 ML/MIN/1.73M2
GLUCOSE SERPL-MCNC: 98 MG/DL (ref 70–99)
HCT VFR BLD AUTO: 32.2 % (ref 35–47)
HCT VFR BLD AUTO: 33.7 % (ref 35–47)
HCT VFR BLD AUTO: 34.4 % (ref 35–47)
HCT VFR BLD AUTO: 37 % (ref 35–47)
HDLC SERPL-MCNC: 46 MG/DL
HGB BLD-MCNC: 10.5 G/DL (ref 11.7–15.7)
HGB BLD-MCNC: 10.5 G/DL (ref 11.7–15.7)
HGB BLD-MCNC: 10.6 G/DL (ref 11.7–15.7)
HGB BLD-MCNC: 11.6 G/DL (ref 11.7–15.7)
LDLC SERPL CALC-MCNC: 69 MG/DL
MCH RBC QN AUTO: 31.3 PG (ref 26.5–33)
MCH RBC QN AUTO: 31.8 PG (ref 26.5–33)
MCH RBC QN AUTO: 31.9 PG (ref 26.5–33)
MCH RBC QN AUTO: 32.4 PG (ref 26.5–33)
MCHC RBC AUTO-ENTMCNC: 30.5 G/DL (ref 31.5–36.5)
MCHC RBC AUTO-ENTMCNC: 31.4 G/DL (ref 31.5–36.5)
MCHC RBC AUTO-ENTMCNC: 31.5 G/DL (ref 31.5–36.5)
MCHC RBC AUTO-ENTMCNC: 32.6 G/DL (ref 31.5–36.5)
MCV RBC AUTO: 101 FL (ref 78–100)
MCV RBC AUTO: 102 FL (ref 78–100)
MCV RBC AUTO: 103 FL (ref 78–100)
MCV RBC AUTO: 99 FL (ref 78–100)
NONHDLC SERPL-MCNC: 88 MG/DL
PLATELET # BLD AUTO: 179 10E3/UL (ref 150–450)
PLATELET # BLD AUTO: 203 10E3/UL (ref 150–450)
PLATELET # BLD AUTO: 222 10E3/UL (ref 150–450)
PLATELET # BLD AUTO: 237 10E3/UL (ref 150–450)
POTASSIUM SERPL-SCNC: 4.6 MMOL/L (ref 3.4–5.3)
PROT SERPL-MCNC: 6.1 G/DL (ref 6.4–8.3)
RBC # BLD AUTO: 3.24 10E6/UL (ref 3.8–5.2)
RBC # BLD AUTO: 3.32 10E6/UL (ref 3.8–5.2)
RBC # BLD AUTO: 3.35 10E6/UL (ref 3.8–5.2)
RBC # BLD AUTO: 3.65 10E6/UL (ref 3.8–5.2)
SODIUM SERPL-SCNC: 135 MMOL/L (ref 136–145)
TRIGL SERPL-MCNC: 94 MG/DL
WBC # BLD AUTO: 3.6 10E3/UL (ref 4–11)
WBC # BLD AUTO: 4.1 10E3/UL (ref 4–11)
WBC # BLD AUTO: 4.4 10E3/UL (ref 4–11)
WBC # BLD AUTO: 5.1 10E3/UL (ref 4–11)

## 2022-01-01 PROCEDURE — 82040 ASSAY OF SERUM ALBUMIN: CPT

## 2022-01-01 PROCEDURE — 80053 COMPREHEN METABOLIC PANEL: CPT | Mod: ORL | Performed by: PHYSICIAN ASSISTANT

## 2022-01-01 PROCEDURE — 87086 URINE CULTURE/COLONY COUNT: CPT | Mod: ORL | Performed by: PHYSICIAN ASSISTANT

## 2022-01-01 PROCEDURE — 84460 ALANINE AMINO (ALT) (SGPT): CPT

## 2022-01-01 PROCEDURE — 99214 OFFICE O/P EST MOD 30 MIN: CPT | Performed by: INTERNAL MEDICINE

## 2022-01-01 PROCEDURE — 82565 ASSAY OF CREATININE: CPT

## 2022-01-01 PROCEDURE — 85027 COMPLETE CBC AUTOMATED: CPT

## 2022-01-01 PROCEDURE — 36415 COLL VENOUS BLD VENIPUNCTURE: CPT

## 2022-01-01 PROCEDURE — 20550 NJX 1 TENDON SHEATH/LIGAMENT: CPT | Mod: LT | Performed by: INTERNAL MEDICINE

## 2022-01-01 PROCEDURE — 99214 OFFICE O/P EST MOD 30 MIN: CPT | Mod: 25 | Performed by: INTERNAL MEDICINE

## 2022-01-01 PROCEDURE — 80061 LIPID PANEL: CPT | Mod: ORL | Performed by: PHYSICIAN ASSISTANT

## 2022-01-01 RX ORDER — METHOTREXATE 2.5 MG/1
TABLET ORAL
Qty: 72 TABLET | Refills: 0 | Status: SHIPPED | OUTPATIENT
Start: 2022-01-01 | End: 2022-01-01

## 2022-01-01 RX ORDER — METHOTREXATE 2.5 MG/1
TABLET ORAL
Qty: 72 TABLET | Refills: 0 | Status: SHIPPED | OUTPATIENT
Start: 2022-01-01 | End: 2023-01-01

## 2022-01-01 RX ORDER — FOLIC ACID 1 MG/1
TABLET ORAL
Qty: 90 TABLET | Refills: 0 | Status: SHIPPED | OUTPATIENT
Start: 2022-01-01 | End: 2023-01-01

## 2022-01-01 RX ORDER — PREDNISONE 5 MG/1
5 TABLET ORAL DAILY
Qty: 30 TABLET | Refills: 0 | Status: SHIPPED | OUTPATIENT
Start: 2022-01-01 | End: 2022-01-01

## 2022-01-01 RX ORDER — SULFASALAZINE 500 MG/1
TABLET ORAL
Qty: 180 TABLET | Refills: 0 | Status: SHIPPED | OUTPATIENT
Start: 2022-01-01 | End: 2022-01-01

## 2022-01-01 RX ORDER — SULFASALAZINE 500 MG/1
500 TABLET ORAL 2 TIMES DAILY
Qty: 180 TABLET | Refills: 0 | Status: SHIPPED | OUTPATIENT
Start: 2022-01-01 | End: 2023-01-01

## 2022-01-01 RX ORDER — HYDROXYCHLOROQUINE SULFATE 200 MG/1
200 TABLET, FILM COATED ORAL 2 TIMES DAILY
Qty: 180 TABLET | Refills: 0 | Status: SHIPPED | OUTPATIENT
Start: 2022-01-01

## 2022-01-01 RX ORDER — TRIAMCINOLONE ACETONIDE 40 MG/ML
20 INJECTION, SUSPENSION INTRA-ARTICULAR; INTRAMUSCULAR ONCE
Status: COMPLETED | OUTPATIENT
Start: 2022-01-01 | End: 2022-01-01

## 2022-01-01 RX ORDER — METHOTREXATE 2.5 MG/1
TABLET ORAL
Qty: 72 TABLET | Refills: 0 | OUTPATIENT
Start: 2022-01-01

## 2022-01-01 RX ORDER — HYDROXYCHLOROQUINE SULFATE 200 MG/1
TABLET, FILM COATED ORAL
Qty: 180 TABLET | Refills: 0 | Status: SHIPPED | OUTPATIENT
Start: 2022-01-01 | End: 2022-01-01

## 2022-01-01 RX ADMIN — TRIAMCINOLONE ACETONIDE 20 MG: 40 INJECTION, SUSPENSION INTRA-ARTICULAR; INTRAMUSCULAR at 13:56

## 2022-06-13 NOTE — PROGRESS NOTES
"      Rheumatology follow-up visit note     Carissa is a 89 year old female presents today for follow-up.    Carissa was seen today for recheck.    Diagnoses and all orders for this visit:    Seronegative rheumatoid arthritis (H)  -     predniSONE (DELTASONE) 5 MG tablet; Take 1 tablet (5 mg) by mouth daily for 30 days    Polyarthralgia    Primary osteoarthritis involving multiple joints    High risk medication use        Her rheumatoid arthritis is not well controlled especially affecting her left hand, wrist, various options keeping in view of the recent labs with leukopenia I have asked her to consider taking a short course of low-dose prednisone at 5 mg daily for the next 30 days.  We will meet here in 2 months.    Follow up in 2 months.    HPI    Carissa Mercado is a 89 year old female is here for follow-up of rheumatoid arthritis on triple regimen of methotrexate sulfasalazine, hydroxychloroquine on folic acid along with.  She has osteoarthritis.  She has noted pain.  She is not happy with how things are especially with her left hand and wrist, she noted pain level to be \"severe\", 5.5/10, interfering with some of the day-to-day activities, she lives by herself.  She is accompanied by her son.  Recent labs reviewed leukopenia noted.  She has chronic anemia. She is aware of management principles of osteoarthritis.  There is no family history of psoriasis ulcerative colitis, Crohn's disease rheumatoid arthritis or lupus.  She lives by herself in an apartment with great neighbors who are very helpful for her.  She is to follow-up here in 6 months.  Keep checking labs every 3 months.            DETAILED EXAMINATION  06/13/22  :    Vitals:    06/13/22 1053   BP: 120/68   BP Location: Right arm   Patient Position: Sitting   Cuff Size: Adult Regular   Pulse: 76   Weight: 59.3 kg (130 lb 11.2 oz)     Alert oriented. Head including the face is examined for malar rash, heliotropes, scarring, lupus pernio. Eyes examined " "for redness such as in episcleritis/scleritis, periorbital lesions.   Neck/ Face examined for parotid gland swelling, range of motion of neck.  Left upper and lower and right upper and lower extremities examined for tenderness, swelling, warmth of the appendicular joints, range of motion, edema, rash.  Some of the important findings included: she does not have evidence of synovitis in the palpable joints of the upper extremities with the exception of her left wrist where she has marked tenderness, MCPs 1 2, IP joint of the left thumb, right side corresponding numbers without inflammation..  No significant deformities of the digits.  Small Heberden nodes.  Range of motion of the shoulders show abduction to about 110 degrees bilaterally..  No JLT effusion or warmth of the knees.  she does not have dactylitis of the digits.     Patient Active Problem List    Diagnosis Date Noted     High risk medication use 05/26/2020     Priority: Medium     Carpal tunnel syndrome, bilateral 11/19/2019     Priority: Medium     Seronegative rheumatoid arthritis (H) 04/18/2019     Priority: Medium     Polyarthralgia 12/05/2018     Priority: Medium     Primary osteoarthritis involving multiple joints 12/05/2018     Priority: Medium     Elevated sed rate 12/05/2018     Priority: Medium     Polymyalgia rheumatica (H) 11/27/2018     Priority: Medium     Essential hypertension, benign 11/27/2018     Priority: Medium     Dyslipidemia 11/27/2018     Priority: Medium     Acute cystitis without hematuria 11/27/2018     Priority: Medium     No past surgical history on file.   Past Medical History:   Diagnosis Date     Dyslipidemia      Hypertension      Allergies   Allergen Reactions     Digoxin Other (See Comments)     \"sick\"     Current Outpatient Medications   Medication Sig Dispense Refill     acetaminophen (TYLENOL) 650 MG CR tablet [ACETAMINOPHEN (TYLENOL) 650 MG CR TABLET] Take 650 mg by mouth every 8 (eight) hours as needed.       " carvedilol (COREG) 25 MG tablet [CARVEDILOL (COREG) 25 MG TABLET] Take 25 mg by mouth 2 (two) times a day with meals.       cyanocobalamin, vitamin B-12, (VITAMIN B12 ORAL) [CYANOCOBALAMIN, VITAMIN B-12, (VITAMIN B12 ORAL)] Take by mouth.       folic acid (FOLVITE) 1 MG tablet Take 1 tablet by mouth once daily 90 tablet 0     furosemide (LASIX) 20 MG tablet [FUROSEMIDE (LASIX) 20 MG TABLET] Take 20 mg by mouth daily.       hydroxychloroquine (PLAQUENIL) 200 MG tablet Take 1 tablet by mouth twice daily 180 tablet 0     hydrOXYchloroQUINE (PLAQUENIL) 200 mg tablet [HYDROXYCHLOROQUINE (PLAQUENIL) 200 MG TABLET] Take 1 tablet (200 mg total) by mouth 2 (two) times a day. 180 tablet 0     lisinopril (PRINIVIL,ZESTRIL) 20 MG tablet [LISINOPRIL (PRINIVIL,ZESTRIL) 20 MG TABLET] Take 20 mg by mouth daily.       methotrexate sodium 2.5 MG TABS TAKE 6 TABLETS BY MOUTH ONCE A WEEK 72 tablet 0     simvastatin (ZOCOR) 20 MG tablet [SIMVASTATIN (ZOCOR) 20 MG TABLET] Take 20 mg by mouth daily.       sulfaSALAzine (AZULFIDINE) 500 MG tablet Take 1 tablet by mouth twice daily 180 tablet 0     sulfaSALAzine (AZULFIDINE) 500 mg tablet [SULFASALAZINE (AZULFIDINE) 500 MG TABLET] Take 1 tablet (500 mg total) by mouth 2 (two) times a day. 180 tablet 0     family history includes Arthritis in her mother; Throat cancer in her father.  Social Connections: Not on file          WBC Count   Date Value Ref Range Status   06/07/2022 3.6 (L) 4.0 - 11.0 10e3/uL Final     RBC Count   Date Value Ref Range Status   06/07/2022 3.32 (L) 3.80 - 5.20 10e6/uL Final     Hemoglobin   Date Value Ref Range Status   06/07/2022 10.6 (L) 11.7 - 15.7 g/dL Final     Hematocrit   Date Value Ref Range Status   06/07/2022 33.7 (L) 35.0 - 47.0 % Final     MCV   Date Value Ref Range Status   06/07/2022 102 (H) 78 - 100 fL Final     MCH   Date Value Ref Range Status   06/07/2022 31.9 26.5 - 33.0 pg Final     Platelet Count   Date Value Ref Range Status   06/07/2022 222 150  - 450 10e3/uL Final     % Lymphocytes   Date Value Ref Range Status   11/27/2018 15 (L) 20 - 40 % Final     AST   Date Value Ref Range Status   10/15/2021 23 0 - 40 U/L Final     ALT   Date Value Ref Range Status   06/07/2022 15 0 - 45 U/L Final     Albumin   Date Value Ref Range Status   06/07/2022 3.9 3.5 - 5.0 g/dL Final     Alkaline Phosphatase   Date Value Ref Range Status   10/15/2021 51 45 - 120 U/L Final     Creatinine   Date Value Ref Range Status   06/07/2022 0.87 0.60 - 1.10 mg/dL Final     GFR Estimate   Date Value Ref Range Status   06/07/2022 62 >60 mL/min/1.73m2 Final     Comment:     Effective December 21, 2021 eGFRcr in adults is calculated using the 2021 CKD-EPI creatinine equation which includes age and gender (Damien et al., NEJM, DOI: 10.1056/KKOPir0938115)   06/14/2021 >60 >60 mL/min/1.73m2 Final     GFR Estimate If Black   Date Value Ref Range Status   06/14/2021 >60 >60 mL/min/1.73m2 Final     Erythrocyte Sedimentation Rate   Date Value Ref Range Status   01/03/2019 40 (H) 0 - 20 mm/hr Final     CRP   Date Value Ref Range Status   09/25/2019 2.4 (H) 0.0 - 0.8 mg/dL Final

## 2022-08-25 NOTE — PROGRESS NOTES
Rheumatology follow-up visit note     Carissa is a 89 year old female presents today for follow-up.    Carissa was seen today for recheck.    Diagnoses and all orders for this visit:    Seronegative rheumatoid arthritis (H)    Polyarthralgia    Primary osteoarthritis involving multiple joints    High risk medication use        She noted her rheumatoid arthritis is under much better control she is no longer on prednisone.  She is on triple regimen.  Recent labs reviewed within acceptable range stable anemia.  Leukopenia no longer the case.  I will stay the course.  We will meet here in 3 months.    Follow up in 3 months.    HPI    Carissa Mercado is a 89 year old female is here for follow-up.  She has seropositive severe rheumatoid arthritis which is now under much better control with the current regimen of methotrexate hydroxychloroquine and sulfasalazine.  On her previous visit she had experienced a flareup we had talked about various options.  At the conclusion was to stay the course for now at least short course of low-dose prednisone at 5 mg daily was given that she has completed and has felt great since.  Although after repeated activities such as doing her own cleaning washing there is more discomfort in the forearm sometimes.  Her son who accompanies her often reminds her to stagger her tasks, she is with great pleasure able to note that she lives on her own.   She is aware of management principles of osteoarthritis.  There is no family history of psoriasis ulcerative colitis, Crohn's disease rheumatoid arthritis or lupus.  She lives by herself in an apartment with great neighbors who are very helpful for her.  She is to follow-up here in 6 months.  Keep checking labs every 3 months.              DETAILED EXAMINATION  08/25/22  :    Vitals:    08/25/22 0924   BP: 106/64   BP Location: Left arm   Patient Position: Sitting   Cuff Size: Adult Regular   Pulse: 76   Weight: 59 kg (130 lb)   Height: 1.651 m  "(5' 5\")     Alert oriented. Head including the face is examined for malar rash, heliotropes, scarring, lupus pernio. Eyes examined for redness such as in episcleritis/scleritis, periorbital lesions.   Neck/ Face examined for parotid gland swelling, range of motion of neck.  Left upper and lower and right upper and lower extremities examined for tenderness, swelling, warmth of the appendicular joints, range of motion, edema, rash.  Some of the important findings included: she does not have evidence of synovitis in the palpable joints of the upper extremities.  No significant deformities of the digits.  + Heberden nodes.  Range of motion of the shoulders  show full abduction.  No JLT effusion or warmth of the knees.  she does not have dactylitis of the digits.     Patient Active Problem List    Diagnosis Date Noted     High risk medication use 05/26/2020     Priority: Medium     Carpal tunnel syndrome, bilateral 11/19/2019     Priority: Medium     Seronegative rheumatoid arthritis (H) 04/18/2019     Priority: Medium     Polyarthralgia 12/05/2018     Priority: Medium     Primary osteoarthritis involving multiple joints 12/05/2018     Priority: Medium     Elevated sed rate 12/05/2018     Priority: Medium     Polymyalgia rheumatica (H) 11/27/2018     Priority: Medium     Essential hypertension, benign 11/27/2018     Priority: Medium     Dyslipidemia 11/27/2018     Priority: Medium     Acute cystitis without hematuria 11/27/2018     Priority: Medium     No past surgical history on file.   Past Medical History:   Diagnosis Date     Dyslipidemia      Hypertension      Allergies   Allergen Reactions     Digoxin Other (See Comments)     \"sick\"     Prednisone Unknown     Current Outpatient Medications   Medication Sig Dispense Refill     acetaminophen (TYLENOL) 650 MG CR tablet [ACETAMINOPHEN (TYLENOL) 650 MG CR TABLET] Take 650 mg by mouth every 8 (eight) hours as needed.       carvedilol (COREG) 25 MG tablet [CARVEDILOL " (COREG) 25 MG TABLET] Take 25 mg by mouth 2 (two) times a day with meals.       cyanocobalamin, vitamin B-12, (VITAMIN B12 ORAL) [CYANOCOBALAMIN, VITAMIN B-12, (VITAMIN B12 ORAL)] Take by mouth.       folic acid (FOLVITE) 1 MG tablet Take 1 tablet by mouth once daily 90 tablet 0     furosemide (LASIX) 20 MG tablet [FUROSEMIDE (LASIX) 20 MG TABLET] Take 20 mg by mouth daily.       hydroxychloroquine (PLAQUENIL) 200 MG tablet Take 1 tablet by mouth twice daily 180 tablet 0     hydrOXYchloroQUINE (PLAQUENIL) 200 mg tablet [HYDROXYCHLOROQUINE (PLAQUENIL) 200 MG TABLET] Take 1 tablet (200 mg total) by mouth 2 (two) times a day. 180 tablet 0     lisinopril (PRINIVIL,ZESTRIL) 20 MG tablet [LISINOPRIL (PRINIVIL,ZESTRIL) 20 MG TABLET] Take 20 mg by mouth daily.       methotrexate sodium 2.5 MG TABS TAKE 6 TABLETS BY MOUTH ONCE A WEEK 72 tablet 0     simvastatin (ZOCOR) 20 MG tablet [SIMVASTATIN (ZOCOR) 20 MG TABLET] Take 20 mg by mouth daily.       sulfaSALAzine (AZULFIDINE) 500 MG tablet Take 1 tablet by mouth twice daily 180 tablet 0     sulfaSALAzine (AZULFIDINE) 500 mg tablet [SULFASALAZINE (AZULFIDINE) 500 MG TABLET] Take 1 tablet (500 mg total) by mouth 2 (two) times a day. 180 tablet 0     family history includes Arthritis in her mother; Throat cancer in her father.  Social Connections: Not on file          WBC Count   Date Value Ref Range Status   08/18/2022 5.1 4.0 - 11.0 10e3/uL Final     RBC Count   Date Value Ref Range Status   08/18/2022 3.24 (L) 3.80 - 5.20 10e6/uL Final     Hemoglobin   Date Value Ref Range Status   08/18/2022 10.5 (L) 11.7 - 15.7 g/dL Final     Hematocrit   Date Value Ref Range Status   08/18/2022 32.2 (L) 35.0 - 47.0 % Final     MCV   Date Value Ref Range Status   08/18/2022 99 78 - 100 fL Final     MCH   Date Value Ref Range Status   08/18/2022 32.4 26.5 - 33.0 pg Final     Platelet Count   Date Value Ref Range Status   08/18/2022 237 150 - 450 10e3/uL Final     % Lymphocytes   Date Value  Ref Range Status   11/27/2018 15 (L) 20 - 40 % Final     AST   Date Value Ref Range Status   10/15/2021 23 0 - 40 U/L Final     ALT   Date Value Ref Range Status   08/18/2022 13 10 - 35 U/L Final     Albumin   Date Value Ref Range Status   08/18/2022 3.8 3.5 - 5.2 g/dL Final   06/07/2022 3.9 3.5 - 5.0 g/dL Final     Alkaline Phosphatase   Date Value Ref Range Status   10/15/2021 51 45 - 120 U/L Final     Creatinine   Date Value Ref Range Status   08/18/2022 0.83 0.51 - 0.95 mg/dL Final     GFR Estimate   Date Value Ref Range Status   08/18/2022 66 >60 mL/min/1.73m2 Final     Comment:     Effective December 21, 2021 eGFRcr in adults is calculated using the 2021 CKD-EPI creatinine equation which includes age and gender (Damien caballero al., NEJ, DOI: 10.1056/SRKYmu7984263)   06/14/2021 >60 >60 mL/min/1.73m2 Final     GFR Estimate If Black   Date Value Ref Range Status   06/14/2021 >60 >60 mL/min/1.73m2 Final     Erythrocyte Sedimentation Rate   Date Value Ref Range Status   01/03/2019 40 (H) 0 - 20 mm/hr Final     CRP   Date Value Ref Range Status   09/25/2019 2.4 (H) 0.0 - 0.8 mg/dL Final

## 2022-11-21 NOTE — TELEPHONE ENCOUNTER
[facility] Call Center    Phone Message    May a detailed message be left on voicemail: yes; please call Adali back     Reason for Call: Medication Refill Request    Has the patient contacted the pharmacy for the refill? Yes; was told to call Dr. Burks's office.     Name of medication being requested: Methotrexate    Provider who prescribed the medication: Dr. Burks    Pharmacy: [facility]    Date medication is needed: ASDAP; Pt is due to take her next dose on Saturday an only have 3 pills; Pt usually takes 6 pills.    Remind patient there is a 72-business hour turn around time on refill requests- yes

## 2022-11-21 NOTE — TELEPHONE ENCOUNTER
LM for daughter to c/b to discuss below.     Provider is out of office till next week, will discuss with provider when he returns.

## 2022-11-23 NOTE — TELEPHONE ENCOUNTER
Per Dr Burks- pt can take minocycline with the rheum meds he prescribes. pts daughter Adali notified

## 2022-12-13 NOTE — PROGRESS NOTES
"      Rheumatology follow-up visit note     Carissa is a 89 year old female presents today for follow-up.    Carissa was seen today for recheck.    Diagnoses and all orders for this visit:    Seronegative rheumatoid arthritis (H)  -     triamcinolone (KENALOG-40) injection 20 mg  -     INJECTION SINGLE TENDON SHEATH/LIGAMENT  -     hydroxychloroquine (PLAQUENIL) 200 MG tablet; Take 1 tablet (200 mg) by mouth 2 times daily  -     sulfaSALAzine (AZULFIDINE) 500 MG tablet; Take 1 tablet (500 mg) by mouth 2 times daily    Trigger finger of left thumb  -     triamcinolone (KENALOG-40) injection 20 mg  -     INJECTION SINGLE TENDON SHEATH/LIGAMENT    Polyarthralgia  -     sulfaSALAzine (AZULFIDINE) 500 MG tablet; Take 1 tablet (500 mg) by mouth 2 times daily        While her rheumatoid arthritis appears to be under control she has recurrence of left trigger thumb that was treated more than 2 years ago with a local steroid injection and she wonders if that can be done again, pros and cons reviewed 20 mg of Kenalog injected at the A1 level into the flexor tendon sheath of the left thumb.  She is to follow-up here in 6 months or sooner labs every 3 months.    Follow up in 6 months.    HPI    Carissa Mercado is a 89 year old female is here for follow-up of seropositive severe rheumatoid arthritis which is now under much better control with the current regimen of methotrexate hydroxychloroquine and sulfasalazine.  She is accompanied by her son.  As she has noted worsening pain.  This is in the left hand.  This is epicentered the left thumb which is \"clicking and locking.  This is gone on for the past several weeks now.           DETAILED EXAMINATION  12/13/22  :    Vitals:    12/13/22 1038   BP: 110/60   BP Location: Right arm   Patient Position: Sitting   Cuff Size: Adult Regular   Pulse: 80   Weight: 56.2 kg (123 lb 14.4 oz)   Height: 1.651 m (5' 5\")     Alert oriented. Head including the face is examined for malar rash, " "heliotropes, scarring, lupus pernio. Eyes examined for redness such as in episcleritis/scleritis, periorbital lesions.   Neck/ Face examined for parotid gland swelling, range of motion of neck.  Left upper and lower and right upper and lower extremities examined for tenderness, swelling, warmth of the appendicular joints, range of motion, edema, rash.  Some of the important findings included: she does not have evidence of synovitis in the palpable joints of the upper extremities.  She has triggering of the left thumb with exquisitely tender A1 nodularity.  Minimal Heberden nodes.  Range of motion of the shoulders  show full abduction.  No JLT effusion or warmth of the knees.  she does not have dactylitis of the digits.     Patient Active Problem List    Diagnosis Date Noted     High risk medication use 05/26/2020     Priority: Medium     Carpal tunnel syndrome, bilateral 11/19/2019     Priority: Medium     Seronegative rheumatoid arthritis (H) 04/18/2019     Priority: Medium     Polyarthralgia 12/05/2018     Priority: Medium     Primary osteoarthritis involving multiple joints 12/05/2018     Priority: Medium     Elevated sed rate 12/05/2018     Priority: Medium     Polymyalgia rheumatica (H) 11/27/2018     Priority: Medium     Essential hypertension, benign 11/27/2018     Priority: Medium     Dyslipidemia 11/27/2018     Priority: Medium     Acute cystitis without hematuria 11/27/2018     Priority: Medium     No past surgical history on file.   Past Medical History:   Diagnosis Date     Dyslipidemia      Hypertension      Allergies   Allergen Reactions     Digoxin Other (See Comments)     \"sick\"     Prednisone Unknown     Current Outpatient Medications   Medication Sig Dispense Refill     acetaminophen (TYLENOL) 650 MG CR tablet [ACETAMINOPHEN (TYLENOL) 650 MG CR TABLET] Take 650 mg by mouth every 8 (eight) hours as needed.       carvedilol (COREG) 25 MG tablet [CARVEDILOL (COREG) 25 MG TABLET] Take 25 mg by mouth 2 " (two) times a day with meals.       cyanocobalamin, vitamin B-12, (VITAMIN B12 ORAL) [CYANOCOBALAMIN, VITAMIN B-12, (VITAMIN B12 ORAL)] Take by mouth.       folic acid (FOLVITE) 1 MG tablet Take 1 tablet by mouth once daily 90 tablet 0     furosemide (LASIX) 20 MG tablet [FUROSEMIDE (LASIX) 20 MG TABLET] Take 20 mg by mouth daily.       hydroxychloroquine (PLAQUENIL) 200 MG tablet Take 1 tablet by mouth twice daily 180 tablet 0     hydrOXYchloroQUINE (PLAQUENIL) 200 mg tablet [HYDROXYCHLOROQUINE (PLAQUENIL) 200 MG TABLET] Take 1 tablet (200 mg total) by mouth 2 (two) times a day. 180 tablet 0     lisinopril (PRINIVIL,ZESTRIL) 20 MG tablet [LISINOPRIL (PRINIVIL,ZESTRIL) 20 MG TABLET] Take 20 mg by mouth daily.       methotrexate sodium 2.5 MG TABS TAKE 6 TABLETS BY MOUTH ONCE A WEEK 72 tablet 0     simvastatin (ZOCOR) 20 MG tablet [SIMVASTATIN (ZOCOR) 20 MG TABLET] Take 20 mg by mouth daily.       sulfaSALAzine (AZULFIDINE) 500 MG tablet Take 1 tablet by mouth twice daily 180 tablet 0     sulfaSALAzine (AZULFIDINE) 500 mg tablet [SULFASALAZINE (AZULFIDINE) 500 MG TABLET] Take 1 tablet (500 mg total) by mouth 2 (two) times a day. 180 tablet 0     family history includes Arthritis in her mother; Throat cancer in her father.  Social Connections: Not on file          WBC Count   Date Value Ref Range Status   11/21/2022 4.4 4.0 - 11.0 10e3/uL Final     RBC Count   Date Value Ref Range Status   11/21/2022 3.35 (L) 3.80 - 5.20 10e6/uL Final     Hemoglobin   Date Value Ref Range Status   11/21/2022 10.5 (L) 11.7 - 15.7 g/dL Final     Hematocrit   Date Value Ref Range Status   11/21/2022 34.4 (L) 35.0 - 47.0 % Final     MCV   Date Value Ref Range Status   11/21/2022 103 (H) 78 - 100 fL Final     MCH   Date Value Ref Range Status   11/21/2022 31.3 26.5 - 33.0 pg Final     Platelet Count   Date Value Ref Range Status   11/21/2022 179 150 - 450 10e3/uL Final     % Lymphocytes   Date Value Ref Range Status   11/27/2018 15 (L) 20  - 40 % Final     AST   Date Value Ref Range Status   09/20/2022 27 10 - 35 U/L Final     ALT   Date Value Ref Range Status   11/21/2022 14 10 - 35 U/L Final     Albumin   Date Value Ref Range Status   11/21/2022 4.0 3.5 - 5.2 g/dL Final   06/07/2022 3.9 3.5 - 5.0 g/dL Final     Alkaline Phosphatase   Date Value Ref Range Status   09/20/2022 81 35 - 104 U/L Final     Creatinine   Date Value Ref Range Status   11/21/2022 0.83 0.51 - 0.95 mg/dL Final     GFR Estimate   Date Value Ref Range Status   11/21/2022 66 >60 mL/min/1.73m2 Final     Comment:     Effective December 21, 2021 eGFRcr in adults is calculated using the 2021 CKD-EPI creatinine equation which includes age and gender (Damien caballero al., NEJM, DOI: 10.1056/OEBTyr1900101)   06/14/2021 >60 >60 mL/min/1.73m2 Final     GFR Estimate If Black   Date Value Ref Range Status   06/14/2021 >60 >60 mL/min/1.73m2 Final     Erythrocyte Sedimentation Rate   Date Value Ref Range Status   01/03/2019 40 (H) 0 - 20 mm/hr Final     CRP   Date Value Ref Range Status   09/25/2019 2.4 (H) 0.0 - 0.8 mg/dL Final

## 2023-01-01 ENCOUNTER — NURSING HOME VISIT (OUTPATIENT)
Dept: GERIATRICS | Facility: CLINIC | Age: 88
End: 2023-01-01
Payer: MEDICARE

## 2023-01-01 ENCOUNTER — TELEPHONE (OUTPATIENT)
Dept: GERIATRICS | Facility: CLINIC | Age: 88
End: 2023-01-01
Payer: MEDICARE

## 2023-01-01 ENCOUNTER — NURSING HOME VISIT (OUTPATIENT)
Dept: GERIATRICS | Facility: CLINIC | Age: 88
End: 2023-01-01
Payer: OTHER MISCELLANEOUS

## 2023-01-01 VITALS
TEMPERATURE: 97 F | SYSTOLIC BLOOD PRESSURE: 97 MMHG | DIASTOLIC BLOOD PRESSURE: 60 MMHG | HEART RATE: 70 BPM | OXYGEN SATURATION: 96 % | RESPIRATION RATE: 15 BRPM | WEIGHT: 115 LBS | BODY MASS INDEX: 17.43 KG/M2 | HEIGHT: 68 IN

## 2023-01-01 VITALS
RESPIRATION RATE: 12 BRPM | TEMPERATURE: 98.5 F | BODY MASS INDEX: 17.43 KG/M2 | HEIGHT: 68 IN | WEIGHT: 115 LBS | HEART RATE: 72 BPM | SYSTOLIC BLOOD PRESSURE: 94 MMHG | OXYGEN SATURATION: 95 % | DIASTOLIC BLOOD PRESSURE: 60 MMHG

## 2023-01-01 VITALS
HEART RATE: 71 BPM | RESPIRATION RATE: 20 BRPM | OXYGEN SATURATION: 94 % | WEIGHT: 122.4 LBS | HEIGHT: 68 IN | BODY MASS INDEX: 18.55 KG/M2 | SYSTOLIC BLOOD PRESSURE: 108 MMHG | DIASTOLIC BLOOD PRESSURE: 64 MMHG | TEMPERATURE: 96.7 F

## 2023-01-01 VITALS
DIASTOLIC BLOOD PRESSURE: 58 MMHG | OXYGEN SATURATION: 96 % | HEIGHT: 68 IN | BODY MASS INDEX: 17.43 KG/M2 | TEMPERATURE: 97 F | RESPIRATION RATE: 17 BRPM | HEART RATE: 78 BPM | SYSTOLIC BLOOD PRESSURE: 122 MMHG | WEIGHT: 115 LBS

## 2023-01-01 DIAGNOSIS — R41.89 COGNITIVE IMPAIRMENT: ICD-10-CM

## 2023-01-01 DIAGNOSIS — I21.4 NSTEMI (NON-ST ELEVATED MYOCARDIAL INFARCTION) (H): Primary | ICD-10-CM

## 2023-01-01 DIAGNOSIS — M06.9 RHEUMATOID ARTHRITIS, INVOLVING UNSPECIFIED SITE, UNSPECIFIED WHETHER RHEUMATOID FACTOR PRESENT (H): Primary | ICD-10-CM

## 2023-01-01 DIAGNOSIS — Z51.5 HOSPICE CARE PATIENT: ICD-10-CM

## 2023-01-01 DIAGNOSIS — D64.9 CHRONIC ANEMIA: ICD-10-CM

## 2023-01-01 DIAGNOSIS — T14.8XXA SCRATCH MARK: ICD-10-CM

## 2023-01-01 DIAGNOSIS — G47.00 INSOMNIA, UNSPECIFIED TYPE: ICD-10-CM

## 2023-01-01 DIAGNOSIS — G47.01 INSOMNIA DUE TO MEDICAL CONDITION: ICD-10-CM

## 2023-01-01 DIAGNOSIS — Z53.9 ERRONEOUS ENCOUNTER--DISREGARD: Primary | ICD-10-CM

## 2023-01-01 DIAGNOSIS — Z85.828 HISTORY OF SKIN CANCER: ICD-10-CM

## 2023-01-01 DIAGNOSIS — I42.8 NONISCHEMIC CARDIOMYOPATHY (H): ICD-10-CM

## 2023-01-01 DIAGNOSIS — I21.4 NSTEMI (NON-ST ELEVATED MYOCARDIAL INFARCTION) (H): ICD-10-CM

## 2023-01-01 DIAGNOSIS — I50.23 ACUTE ON CHRONIC HFREF (HEART FAILURE WITH REDUCED EJECTION FRACTION) (H): ICD-10-CM

## 2023-01-01 DIAGNOSIS — M06.9 RHEUMATOID ARTHRITIS INVOLVING MULTIPLE SITES, UNSPECIFIED WHETHER RHEUMATOID FACTOR PRESENT (H): ICD-10-CM

## 2023-01-01 DIAGNOSIS — U07.1 COVID-19: Primary | ICD-10-CM

## 2023-01-01 DIAGNOSIS — I50.22 CHRONIC HFREF (HEART FAILURE WITH REDUCED EJECTION FRACTION) (H): ICD-10-CM

## 2023-01-01 PROCEDURE — 99309 SBSQ NF CARE MODERATE MDM 30: CPT | Mod: CS | Performed by: NURSE PRACTITIONER

## 2023-01-01 PROCEDURE — 99309 SBSQ NF CARE MODERATE MDM 30: CPT | Mod: GV | Performed by: NURSE PRACTITIONER

## 2023-01-01 PROCEDURE — 99305 1ST NF CARE MODERATE MDM 35: CPT | Mod: GV | Performed by: INTERNAL MEDICINE

## 2023-01-01 RX ORDER — ACETAMINOPHEN 650 MG/1
650 SUPPOSITORY RECTAL EVERY 6 HOURS PRN
COMMUNITY

## 2023-01-01 RX ORDER — MORPHINE SULFATE 30 MG/1
2.5 TABLET ORAL
COMMUNITY

## 2023-01-01 RX ORDER — AMOXICILLIN 250 MG
1 CAPSULE ORAL DAILY PRN
COMMUNITY

## 2023-01-01 RX ORDER — BISACODYL 10 MG
10 SUPPOSITORY, RECTAL RECTAL DAILY PRN
COMMUNITY

## 2023-01-01 RX ORDER — LORAZEPAM 0.5 MG/1
0.5 TABLET ORAL EVERY 4 HOURS PRN
COMMUNITY

## 2023-01-01 RX ORDER — METOPROLOL SUCCINATE 25 MG/1
25 TABLET, EXTENDED RELEASE ORAL DAILY
COMMUNITY

## 2023-01-01 RX ORDER — TRAZODONE HYDROCHLORIDE 50 MG/1
25 TABLET, FILM COATED ORAL AT BEDTIME
COMMUNITY

## 2023-01-01 RX ORDER — PROCHLORPERAZINE MALEATE 10 MG
10 TABLET ORAL EVERY 6 HOURS PRN
COMMUNITY

## 2023-01-12 NOTE — LETTER
1/12/2023        RE: [facility]  [address]  [facility] [address]        Erroneous encounter, disregard        Sincerely,        GILL Garza CNP

## 2023-01-16 PROBLEM — Z86.79 HISTORY OF HYPERTENSION: Status: ACTIVE | Noted: 2023-01-01

## 2023-01-16 PROBLEM — E87.20 METABOLIC ACIDOSIS: Status: ACTIVE | Noted: 2023-01-01

## 2023-01-16 PROBLEM — I42.8 NONISCHEMIC CARDIOMYOPATHY (H): Status: ACTIVE | Noted: 2023-01-01

## 2023-01-16 PROBLEM — R41.0 DELIRIUM: Status: ACTIVE | Noted: 2023-01-01

## 2023-01-16 PROBLEM — M06.9 RA (RHEUMATOID ARTHRITIS) (H): Status: ACTIVE | Noted: 2023-01-01

## 2023-01-16 PROBLEM — E78.5 HYPERLIPIDEMIA: Status: ACTIVE | Noted: 2023-01-01

## 2023-01-16 PROBLEM — I44.7 LBBB (LEFT BUNDLE BRANCH BLOCK): Status: ACTIVE | Noted: 2023-01-01

## 2023-01-16 PROBLEM — D64.9 CHRONIC ANEMIA: Status: ACTIVE | Noted: 2023-01-01

## 2023-01-16 PROBLEM — I50.23 ACUTE ON CHRONIC HFREF (HEART FAILURE WITH REDUCED EJECTION FRACTION) (H): Status: ACTIVE | Noted: 2023-01-01

## 2023-01-16 PROBLEM — I21.4 NSTEMI (NON-ST ELEVATED MYOCARDIAL INFARCTION) (H): Status: ACTIVE | Noted: 2023-01-01

## 2023-01-16 PROBLEM — I34.0 MODERATE MITRAL REGURGITATION: Status: ACTIVE | Noted: 2023-01-01

## 2023-01-16 NOTE — PROGRESS NOTES
"[facility] GERIATRICS    PRIMARY CARE PROVIDER AND CLINIC:  GILL Garza CNP, [address] [facility] [address]  Chief Complaint   Patient presents with     [facility] Medical Record Number:  [id number]  Place of Service where encounter took place:  [facility] ([facility]) [[id number]]    Carissa Mercado  is a 89 year old  (5/28/1930), admitted to the above facility from  [facility]. Hospital stay 1/4/23 through 1/11/23..   HPI:    Per chart review, Epic hospital notes, labs, imaging, medication/changes, nursing and patient:    Patient with PMH RA, HFrEF, HLD, chronic anemia and skin cancer admitted to LTC following hospitalization as above for dizziness, nausea, SOB and weakness. W/u revealed NSTEMI and CHF exacerbation. Started on heparin. Cards consulted. Echo revealed LVEF 10-15%. Patient and family decided on conservative care goals.  Palliative care consulted and patient and family decided on hospice care. Patient started on Toprol and lisinopril as well as low dose lasix and discharged to this facility. Has since been enrolled in hospice.     Nursing reports noting superficial open area to graft site on left forehead. Covered with foam dressing.    Today patient is found sitting around a table visiting with family and friends who step out for a bit. Patient is alert, calm, NAD. Denies pain. Denies SOB, cough, chest pain, dizziness currently. Reports is adjusting to new setting. Reports a little concerned about open area on forehead \"after going through all of that\". Writer examined and reassured patient.     VS and weights reviewed in facility EMR.  CODE STATUS/ADVANCE DIRECTIVES DISCUSSION:  Full Code  CPR/Full code   ALLERGIES:   Allergies   Allergen Reactions     Digoxin Other (See Comments)     \"sick\"     Prednisone Unknown      PAST MEDICAL HISTORY:   Past Medical History:   Diagnosis Date     Dyslipidemia      Hypertension       PAST " SURGICAL HISTORY:   has no past surgical history on file.  FAMILY HISTORY: family history includes Arthritis in her mother; Throat cancer in her father.  SOCIAL HISTORY:   reports that she has quit smoking. Her smoking use included cigarettes and cigarettes. She has never used smokeless tobacco. She reports current alcohol use. She reports that she does not use drugs.  Patient's living condition: lives alone    Post Discharge Medication Reconciliation Status:   MED REC REQUIRED    Post Medication Reconciliation Status: discharge medications reconciled and changed, per note/orders       Current Outpatient Medications   Medication Sig     acetaminophen (TYLENOL) 650 MG suppository Place 650 mg rectally every 6 hours as needed for fever     bisacodyl (DULCOLAX) 10 MG suppository Place 10 mg rectally daily as needed for constipation     furosemide (LASIX) 20 MG tablet Take 10 mg by mouth daily     hydroxychloroquine (PLAQUENIL) 200 MG tablet Take 1 tablet (200 mg) by mouth 2 times daily     hyoscyamine (LEVSIN/SL) 0.125 MG sublingual tablet Place 0.125 mg under the tongue every 4 hours as needed for cramping     lisinopril (ZESTRIL) 2.5 MG tablet Take 2.5 mg by mouth daily     LORazepam (ATIVAN) 0.5 MG tablet Take 0.5 mg by mouth every 4 hours as needed for anxiety     melatonin 5 MG tablet Take 5 mg by mouth At Bedtime     metoprolol succinate ER (TOPROL XL) 25 MG 24 hr tablet Take 25 mg by mouth daily     morphine 2.5 MG solu-tab Take 2.5 mg by mouth every hour as needed for shortness of breath or moderate to severe pain     prochlorperazine (COMPAZINE) 10 MG tablet Take 10 mg by mouth every 6 hours as needed for nausea or vomiting     senna-docusate (SENOKOT-S/PERICOLACE) 8.6-50 MG tablet Take 1 tablet by mouth daily as needed for constipation     traZODone (DESYREL) 50 MG tablet Take 25 mg by mouth At Bedtime     No current facility-administered medications for this visit.       ROS:  10 point ROS of systems  "including Constitutional, Eyes, Respiratory, Cardiovascular, Gastroenterology, Genitourinary, Integumentary, Musculoskeletal, Psychiatric were all negative except for pertinent positives noted in my HPI.    Vitals:  /58   Pulse 78   Temp 97  F (36.1  C)   Resp 17   Ht 1.727 m (5' 8\")   Wt 52.2 kg (115 lb)   SpO2 96%   BMI 17.49 kg/m    Exam:  GENERAL APPEARANCE: Alert, in no distress   ENT: Mouth and posterior oropharynx normal, moist mucous membranes   EYES: EOM, conjunctivae, lids, pupils and irises normal   NECK: No adenopathy,masses or thyromegaly   RESP: respiratory effort and palpation of chest normal, Lungs clear to auscultation  CV: VS as above, no edema noted  ABDOMEN: normal bowel sounds, soft, nontender, no hepatosplenomegaly or other masses   : palpation of bladder WNL   M/S: Gait and station normal   Digits and nails normal - BURNS- w/c  SKIN: Inspection of skin and subcutaneous tissue baseline, Palpation of skin and subcutaneous tissue baseline- small oval very superficial abrasion appearing wound over grafted site on left forehead. Scant serosanguinous drainage on foam dressing  NEURO: Cranial nerves 2-12 are normal tested and grossly at patient's baseline, Examination of sensation by touch normal   PSYCH: oriented X 3, affect and mood normal             Lab/Diagnostic data:  Recent labs in [facility] reviewed by me today.     ASSESSMENT/PLAN:    NSTEMI (non-ST elevated myocardial infarction) (H)  - new inpatient. Conservative medical treatment. Toprol, lasix and lisinopril added inpatient.Decided not to pursue ASA. Patient continues on these medications. SBP 90s- no noted associated s/s.   - monitor VS  - follow clinically, if becomes symptomatic with BP may need to adjust meds down  Acute on chronic HFrEF (heart failure with reduced ejection fraction) (H)  EF 10 - 15% inpatient Echo. Appears euvolemic. Respiratory status stable  - continue on lasix, Toprol as above  - monitor weights, VS  - " hospice med kit in place  - hospice, goals of care comfort.     Rheumatoid arthritis involving multiple sites, unspecified whether rheumatoid factor present (H)  - chronic, no new flares  - continues on hydroxychloroquine - appears she was also to continue methotrexate but unable to see it on the facility MAR. Will look in to this further.   - Also continues on Folic Acid  - continues on prn acetaminophen and MS per hospice    Chronic anemia  Baseline hgb 10-11, Last 10.5. No noted s/s.   - no planned lab draws given goals of care    History of skin cancer  - with removal and grafting. Small open area developed. Very superficial. Keep clean and covered with foam until healed.     Cognitive impairment  - BIMS 7/15. Very [place]   - appropriate in communication today when spoke loud and clear and sat close. Good historian, remains social.  -ensure means taken for good communication- hearing aids, pocket talker, writing board  - ongoing supportive cares and treatments in LTC  Insomnia  - chronic  - continue on PTA Trazodone and melatonin      Hospice care patient    Discussed orders and plan of care with patient and nursing today      Electronically signed by:  GILL Garza CNP

## 2023-01-16 NOTE — LETTER
"    1/16/2023        RE: Carissa Mercado  [address]  [facility] [address]        [facility] GERIATRICS    PRIMARY CARE PROVIDER AND CLINIC:  GILL Garza [facility], [address] [facility] [address]  Chief Complaint   Patient presents with     [facility] Medical Record Number:  [id number]  Place of Service where encounter took place:  [facility] () [[id number]]    Carissa Mercado  is a 89 year old  (5/28/1930), admitted to the above facility from  [facility]. Hospital stay 1/4/23 through 1/11/23..   HPI:    Per chart review, Epic hospital notes, labs, imaging, medication/changes, nursing and patient:    Patient with PMH RA, HFrEF, HLD, chronic anemia and skin cancer admitted to LTC following hospitalization as above for dizziness, nausea, SOB and weakness. W/u revealed NSTEMI and CHF exacerbation. Started on heparin. Cards consulted. Echo revealed LVEF 10-15%. Patient and family decided on conservative care goals.  Palliative care consulted and patient and family decided on hospice care. Patient started on Toprol and lisinopril as well as low dose lasix and discharged to this facility. Has since been enrolled in hospice.     Nursing reports noting superficial open area to graft site on left forehead. Covered with foam dressing.    Today patient is found sitting around a table visiting with family and friends who step out for a bit. Patient is alert, calm, NAD. Denies pain. Denies SOB, cough, chest pain, dizziness currently. Reports is adjusting to new setting. Reports a little concerned about open area on forehead \"after going through all of that\". Writer examined and reassured patient.     VS and weights reviewed in facility EMR.  CODE STATUS/ADVANCE DIRECTIVES DISCUSSION:  Full Code  CPR/Full code   ALLERGIES:   Allergies   Allergen Reactions     Digoxin Other (See Comments)     \"sick\"     Prednisone Unknown      PAST MEDICAL " HISTORY:   Past Medical History:   Diagnosis Date     Dyslipidemia      Hypertension       PAST SURGICAL HISTORY:   has no past surgical history on file.  FAMILY HISTORY: family history includes Arthritis in her mother; Throat cancer in her father.  SOCIAL HISTORY:   reports that she has quit smoking. Her smoking use included cigarettes and cigarettes. She has never used smokeless tobacco. She reports current alcohol use. She reports that she does not use drugs.  Patient's living condition: lives alone    Post Discharge Medication Reconciliation Status:   MED REC REQUIRED    Post Medication Reconciliation Status: discharge medications reconciled and changed, per note/orders       Current Outpatient Medications   Medication Sig     acetaminophen (TYLENOL) 650 MG suppository Place 650 mg rectally every 6 hours as needed for fever     bisacodyl (DULCOLAX) 10 MG suppository Place 10 mg rectally daily as needed for constipation     furosemide (LASIX) 20 MG tablet Take 10 mg by mouth daily     hydroxychloroquine (PLAQUENIL) 200 MG tablet Take 1 tablet (200 mg) by mouth 2 times daily     hyoscyamine (LEVSIN/SL) 0.125 MG sublingual tablet Place 0.125 mg under the tongue every 4 hours as needed for cramping     lisinopril (ZESTRIL) 2.5 MG tablet Take 2.5 mg by mouth daily     LORazepam (ATIVAN) 0.5 MG tablet Take 0.5 mg by mouth every 4 hours as needed for anxiety     melatonin 5 MG tablet Take 5 mg by mouth At Bedtime     metoprolol succinate ER (TOPROL XL) 25 MG 24 hr tablet Take 25 mg by mouth daily     morphine 2.5 MG solu-tab Take 2.5 mg by mouth every hour as needed for shortness of breath or moderate to severe pain     prochlorperazine (COMPAZINE) 10 MG tablet Take 10 mg by mouth every 6 hours as needed for nausea or vomiting     senna-docusate (SENOKOT-S/PERICOLACE) 8.6-50 MG tablet Take 1 tablet by mouth daily as needed for constipation     traZODone (DESYREL) 50 MG tablet Take 25 mg by mouth At Bedtime     No  "current facility-administered medications for this visit.       ROS:  10 point ROS of systems including Constitutional, Eyes, Respiratory, Cardiovascular, Gastroenterology, Genitourinary, Integumentary, Musculoskeletal, Psychiatric were all negative except for pertinent positives noted in my HPI.    Vitals:  /58   Pulse 78   Temp 97  F (36.1  C)   Resp 17   Ht 1.727 m (5' 8\")   Wt 52.2 kg (115 lb)   SpO2 96%   BMI 17.49 kg/m    Exam:  GENERAL APPEARANCE: Alert, in no distress   ENT: Mouth and posterior oropharynx normal, moist mucous membranes   EYES: EOM, conjunctivae, lids, pupils and irises normal   NECK: No adenopathy,masses or thyromegaly   RESP: respiratory effort and palpation of chest normal, Lungs clear to auscultation  CV: VS as above, no edema noted  ABDOMEN: normal bowel sounds, soft, nontender, no hepatosplenomegaly or other masses   : palpation of bladder WNL   M/S: Gait and station normal   Digits and nails normal - BURNS- w/c  SKIN: Inspection of skin and subcutaneous tissue baseline, Palpation of skin and subcutaneous tissue baseline- small oval very superficial abrasion appearing wound over grafted site on left forehead. Scant serosanguinous drainage on foam dressing  NEURO: Cranial nerves 2-12 are normal tested and grossly at patient's baseline, Examination of sensation by touch normal   PSYCH: oriented X 3, affect and mood normal             Lab/Diagnostic data:  Recent labs in [facility] reviewed by me today.     ASSESSMENT/PLAN:    NSTEMI (non-ST elevated myocardial infarction) (H)  - new inpatient. Conservative medical treatment. Toprol, lasix and lisinopril added inpatient.Decided not to pursue ASA. Patient continues on these medications. SBP 90s- no noted associated s/s.   - monitor VS  - follow clinically, if becomes symptomatic with BP may need to adjust meds down  Acute on chronic HFrEF (heart failure with reduced ejection fraction) (H)  EF 10 - 15% inpatient Echo. Appears " euvolemic. Respiratory status stable  - continue on lasix, Toprol as above  - monitor weights, VS  - hospice med kit in place  - hospice, goals of care comfort.     Rheumatoid arthritis involving multiple sites, unspecified whether rheumatoid factor present (H)  - chronic, no new flares  - continues on hydroxychloroquine - appears she was also to continue methotrexate but unable to see it on the facility MAR. Will look in to this further.   - Also continues on Folic Acid  - continues on prn acetaminophen and MS per hospice    Chronic anemia  Baseline hgb 10-11, Last 10.5. No noted s/s.   - no planned lab draws given goals of care    History of skin cancer  - with removal and grafting. Small open area developed. Very superficial. Keep clean and covered with foam until healed.     Cognitive impairment  - BIMS 7/15. Very [place]   - appropriate in communication today when spoke loud and clear and sat close. Good historian, remains social.  -ensure means taken for good communication- hearing aids, pocket talker, writing board  - ongoing supportive cares and treatments in LTC  Insomnia  - chronic  - continue on PTA Trazodone and melatonin      Hospice care patient    Discussed orders and plan of care with patient and nursing today      Electronically signed by:  GILL Garza CNP                     Sincerely,        GILL Garza CNP

## 2023-01-19 NOTE — PROGRESS NOTES
"[facility] GERIATRICS  INITIAL VISIT NOTE  January 19, 2023    PRIMARY CARE PROVIDER AND CLINIC:  Amara Hunt [address] [facility] [facility] [address] [facility] [address]    [facility] Medical Record Number:  [id number]  Place of Service where encounter took place:  [facility] () [[id number]]    Chief Complaint   Patient presents with     Establish Care       HPI:    Carissa Mercado is a 89 year old  (5/28/1930) female seen today at [facility]. Medical history is notable for rheumatoid arthritis, non ischemic cardiomyopathy (EF 35-40%), HTN. She was hospitalized at [facility] from 1/4/23 to 1/11/23 with dizziness and shortness of breath and was found to have a NSTEMI. TTE with reduced EF of 10-15%. New on metoprolol, lisinopril and furosemide. Given overall clinical picture, family and patient elected to enroll in hospice. She was admitted to this facility for  medical management, nursing care and hospice.      History obtained from: facility chart records, facility staff, patient report, [facility] chart review and [facility] chart review.      Today, Ms. Mercado is seen in her room, initially laying in bed but then sitting on the side of the bed. Still some dizziness with position changes, but it's improving. No chest pain. No dyspnea per se. She has a electronic picture frame in her room with many photos of the [place] - she spent a lot of time near [place]. She tells me she sleeps with her head at the foot of the bed as it's closer to the door - it's too warm to have her head at the head of the bed in the corner. No concerns today per discussion with nursing. BIMS 7/15. She is enrolled with [facility]    CODE STATUS: CPR/Full code     ALLERGIES:  Allergies   Allergen Reactions     Digoxin Other (See Comments)     \"sick\"     Prednisone Unknown       PAST MEDICAL HISTORY:   Past Medical History:   Diagnosis Date     Dyslipidemia      Hypertension  " "      FAMILY HISTORY:   Family History   Problem Relation Age of Onset     Arthritis Mother      Throat cancer Father      SOCIAL HISTORY:   Patient's living condition: lives in a skilled nursing facility    MEDICATIONS:  Post Discharge Medication Reconciliation Status: discharge medications reconciled and changed, per note/orders.  Current Outpatient Medications   Medication Sig Dispense Refill     acetaminophen (TYLENOL) 650 MG suppository Place 650 mg rectally every 6 hours as needed for fever       bisacodyl (DULCOLAX) 10 MG suppository Place 10 mg rectally daily as needed for constipation       furosemide (LASIX) 20 MG tablet Take 10 mg by mouth daily       hydroxychloroquine (PLAQUENIL) 200 MG tablet Take 1 tablet (200 mg) by mouth 2 times daily 180 tablet 0     hyoscyamine (LEVSIN/SL) 0.125 MG sublingual tablet Place 0.125 mg under the tongue every 4 hours as needed for cramping       lisinopril (ZESTRIL) 2.5 MG tablet Take 2.5 mg by mouth daily       LORazepam (ATIVAN) 0.5 MG tablet Take 0.5 mg by mouth every 4 hours as needed for anxiety       melatonin 5 MG tablet Take 5 mg by mouth At Bedtime       metoprolol succinate ER (TOPROL XL) 25 MG 24 hr tablet Take 25 mg by mouth daily       morphine 2.5 MG solu-tab Take 2.5 mg by mouth every hour as needed for shortness of breath or moderate to severe pain       prochlorperazine (COMPAZINE) 10 MG tablet Take 10 mg by mouth every 6 hours as needed for nausea or vomiting       senna-docusate (SENOKOT-S/PERICOLACE) 8.6-50 MG tablet Take 1 tablet by mouth daily as needed for constipation       traZODone (DESYREL) 50 MG tablet Take 25 mg by mouth At Bedtime         ROS:  Unable to obtain due to cognitive impairment or aphasia  BIMS 7/15.     PHYSICAL EXAM:  BP 97/60   Pulse 70   Temp 97  F (36.1  C)   Resp 15   Ht 1.727 m (5' 8\")   Wt 52.2 kg (115 lb)   SpO2 96%   BMI 17.49 kg/m    Gen: sitting on side of the bed, alert, cooperative and in no acute " distress  Card: RRR, S1, S2, no murmurs  Resp: lungs clear to auscultation bilaterally, no crackles or wheezes  Ext: no LE edema  Neuro: CX II-XII grossly in tact; ROM in all four extremities grossly in tact  Psych: alert and oriented to self and general situation; normal affect     LABORATORY/IMAGING DATA:  Reviewed as per [facility] and/or [facility]    ASSESSMENT/PLAN:    NSTEMI  HFrEF (EF 10-15%)  Hospice Care Patient  New on ACE-I, BB and diuretic during recent hospital stay. She's enrolled in hospice and seems to have some positional dizziness - SBPs 90s. HR 60s-70s. No weight trend  -- her MAPS are adequate and would expect lower BPs with her EF, but also would be curious if that positional dizziness abated without the lisinopril?   -- not sure if hospice is adjusting cardiac meds or primary team?   -- furosemide 10 mg daily,  lisinopril 2.5 mg daily and metoprolol XL 25 mg daily    Rheumatoid Arthritis  No signs of flare   -- hydroxychloroquine 200 mg BID     Insomnia  -- trazodone 25 mg at bedtime    Cognitive Impairment  BIMS 7/15. Unsure baseline  -- ongoing 24/7 nursing and supportive cares     Electronically signed by:  Phuong Gonzalez MD

## 2023-01-19 NOTE — LETTER
"    1/19/2023        RE: Carissa Mercado  [address]  [facility] [address]        M [facility] GERIATRICS  INITIAL VISIT NOTE  January 19, 2023    PRIMARY CARE PROVIDER AND CLINIC:  Amara Hunt [address] [facility] / [place] [address]    M [facility] Medical Record Number:  [id number]  Place of Service where encounter took place:  [facility] () [[id number]]    Chief Complaint   Patient presents with     Establish Care       HPI:    Carissa Mercado is a 89 year old  (5/28/1930) female seen today at [facility]. Medical history is notable for rheumatoid arthritis, non ischemic cardiomyopathy (EF 35-40%), HTN. She was hospitalized at [place] from 1/4/23 to 1/11/23 with dizziness and shortness of breath and was found to have a NSTEMI. TTE with reduced EF of 10-15%. New on metoprolol, lisinopril and furosemide. Given overall clinical picture, family and patient elected to enroll in hospice. She was admitted to this facility for  medical management, nursing care and hospice.      History obtained from: facility chart records, facility staff, patient report, [facility] chart review and [facility].      Today, Ms. Mercado is seen in her room, initially laying in bed but then sitting on the side of the bed. Still some dizziness with position changes, but it's improving. No chest pain. No dyspnea per se. She has a electronic picture frame in her room with many photos of the [place] - she spent a lot of time near [place]. She tells me she sleeps with her head at the foot of the bed as it's closer to the door - it's too warm to have her head at the head of the bed in the corner. No concerns today per discussion with nursing. BIMS 7/15. She is enrolled with [facility]    CODE STATUS: CPR/Full code     ALLERGIES:  Allergies   Allergen Reactions     Digoxin Other (See Comments)     \"sick\"     Prednisone Unknown       PAST MEDICAL " "HISTORY:   Past Medical History:   Diagnosis Date     Dyslipidemia      Hypertension        FAMILY HISTORY:   Family History   Problem Relation Age of Onset     Arthritis Mother      Throat cancer Father      SOCIAL HISTORY:   Patient's living condition: lives in a skilled nursing facility    MEDICATIONS:  Post Discharge Medication Reconciliation Status: discharge medications reconciled and changed, per note/orders.  Current Outpatient Medications   Medication Sig Dispense Refill     acetaminophen (TYLENOL) 650 MG suppository Place 650 mg rectally every 6 hours as needed for fever       bisacodyl (DULCOLAX) 10 MG suppository Place 10 mg rectally daily as needed for constipation       furosemide (LASIX) 20 MG tablet Take 10 mg by mouth daily       hydroxychloroquine (PLAQUENIL) 200 MG tablet Take 1 tablet (200 mg) by mouth 2 times daily 180 tablet 0     hyoscyamine (LEVSIN/SL) 0.125 MG sublingual tablet Place 0.125 mg under the tongue every 4 hours as needed for cramping       lisinopril (ZESTRIL) 2.5 MG tablet Take 2.5 mg by mouth daily       LORazepam (ATIVAN) 0.5 MG tablet Take 0.5 mg by mouth every 4 hours as needed for anxiety       melatonin 5 MG tablet Take 5 mg by mouth At Bedtime       metoprolol succinate ER (TOPROL XL) 25 MG 24 hr tablet Take 25 mg by mouth daily       morphine 2.5 MG solu-tab Take 2.5 mg by mouth every hour as needed for shortness of breath or moderate to severe pain       prochlorperazine (COMPAZINE) 10 MG tablet Take 10 mg by mouth every 6 hours as needed for nausea or vomiting       senna-docusate (SENOKOT-S/PERICOLACE) 8.6-50 MG tablet Take 1 tablet by mouth daily as needed for constipation       traZODone (DESYREL) 50 MG tablet Take 25 mg by mouth At Bedtime         ROS:  Unable to obtain due to cognitive impairment or aphasia  BIMS 7/15.     PHYSICAL EXAM:  BP 97/60   Pulse 70   Temp 97  F (36.1  C)   Resp 15   Ht 1.727 m (5' 8\")   Wt 52.2 kg (115 lb)   SpO2 96%   BMI 17.49 " kg/m    Gen: sitting on side of the bed, alert, cooperative and in no acute distress  Card: RRR, S1, S2, no murmurs  Resp: lungs clear to auscultation bilaterally, no crackles or wheezes  Ext: no LE edema  Neuro: CX II-XII grossly in tact; ROM in all four extremities grossly in tact  Psych: alert and oriented to self and general situation; normal affect     LABORATORY/IMAGING DATA:  Reviewed as per [facility] and/or [facility]    ASSESSMENT/PLAN:    NSTEMI  HFrEF (EF 10-15%)  Hospice Care Patient  New on ACE-I, BB and diuretic during recent hospital stay. She's enrolled in hospice and seems to have some positional dizziness - SBPs 90s. HR 60s-70s. No weight trend  -- her MAPS are adequate and would expect lower BPs with her EF, but also would be curious if that positional dizziness abated without the lisinopril?   -- not sure if hospice is adjusting cardiac meds or primary team?   -- furosemide 10 mg daily,  lisinopril 2.5 mg daily and metoprolol XL 25 mg daily    Rheumatoid Arthritis  No signs of flare   -- hydroxychloroquine 200 mg BID     Insomnia  -- trazodone 25 mg at bedtime    Cognitive Impairment  BIMS 7/15. Unsure baseline  -- ongoing 24/7 nursing and supportive cares     Electronically signed by:  Phuong Gonzalez MD                            Sincerely,        Phuong Gonzalez MD

## 2023-01-20 NOTE — TELEPHONE ENCOUNTER
"[facility] Triage Nurse Telephone Encounter    Provider: GILL Garza CNP   Facility: [facility] Facility Type:  LTC    Caller: Carie    Allergies:    Allergies   Allergen Reactions     Digoxin Other (See Comments)     \"sick\"     Prednisone Unknown        Reason for call: Nursing is calling to report that the patient obtain 2 skin tears to the right lower extremity, patient says she bumped her leg when she got out of her wheelchair.  Area has been cleansed and Steri-Strips applied at this time.    Verbal Order/Direction given by Provider: No new orders, continue to monitor site    Provider giving Order:  Phuong Blackmon MD    Verbal Order given to: Carie Wright RN      "

## 2023-02-06 NOTE — LETTER
"    2/6/2023        RE: Carissa Mercado  [address]  [facility] [address]        M [facility] GERIATRICS    Chief Complaint   Patient presents with     Nursing Home Acute     HPI:  Carissa Mercado is a 89 year old  (5/28/1930), who is being seen today for an episodic care visit at: [facility] () [[id number]].     Patient seen today 2/2 new COVID 19 + on routine rapid screen. Is in SNF and on hospice 2/2 nonischemic cardiomyopathy and recent MI.     Nursing reports increased weakness, fatigue and cough.    Patient found lying in bed today. Awake but closes eyes and only mumbles responses. Reports fatigue and not feeling well.    Allergies, and PMH/PSH reviewed in EPIC today.  REVIEW OF SYSTEMS:  Limited secondary to cognitive impairment but today pt reports as above    Objective:   /64   Pulse 71   Temp (!) 96.7  F (35.9  C)   Resp 20   Ht 1.727 m (5' 8\")   Wt 55.5 kg (122 lb 6.4 oz)   SpO2 94%   BMI 18.61 kg/m      Resp: Effort WNL, LS  Decreased bilaterally  CV:VS as above, trace bilateral pedal edema  Abd- soft, nontender, BS +  Musc- very weak  Skin- derm protectors on legs, scratches on bilateral feet noted. No noted erythema or warmth  Psych- as above          Assessment/Plan:  COVID-19  - diagnosed on routine rapid screen today. Patient is weak, fatigued and with occasional cough. Discussed adding antiviral with patient's son and with hospice. Decision made not to add d/t comfort care goals and patient current clinical status. Patient PO has dropped off. Comfort meds in place.   Scratch marks  - ? Scratching or itching. Has derm protectors on legs bilaterally. No evidence of cellulitis  - keep skin clean, dry and keep socks on feet    NSTEMI (non-ST elevated myocardial infarction) (H)  - medically managed- conservative, comfort care goals per patient and family  Nonischemic cardiomyopathy (H)  - known  Acute on chronic HFrEF (heart failure with reduced " ejection fraction) (H)  - EF 10 -15 % on recent echo. Trace pedal edema  - Hold Lasix- as po has dropped off and patient appears slightly dry. (Discussed with hospice)  - encourage po fluids as/if patient tolerates, continue frequent oral cares          Orders:  - written on unit and discussed with nursing, hospice, patient's son.    Electronically signed by: GILL Garza CNP           Sincerely,        GILL Garza CNP

## 2023-02-06 NOTE — PROGRESS NOTES
"[facility] GERIATRICS    Chief Complaint   Patient presents with     Nursing Home Acute     HPI:  Carissa Mercado is a 89 year old  (5/28/1930), who is being seen today for an episodic care visit at: [facility] () [[id number]].     Patient seen today 2/2 new COVID 19 + on routine rapid screen. Is in SNF and on hospice 2/2 nonischemic cardiomyopathy and recent MI.     Nursing reports increased weakness, fatigue and cough.    Patient found lying in bed today. Awake but closes eyes and only mumbles responses. Reports fatigue and not feeling well.    Allergies, and PMH/PSH reviewed in EPIC today.  REVIEW OF SYSTEMS:  Limited secondary to cognitive impairment but today pt reports as above    Objective:   /64   Pulse 71   Temp (!) 96.7  F (35.9  C)   Resp 20   Ht 1.727 m (5' 8\")   Wt 55.5 kg (122 lb 6.4 oz)   SpO2 94%   BMI 18.61 kg/m      Resp: Effort WNL, LS  Decreased bilaterally  CV:VS as above, trace bilateral pedal edema  Abd- soft, nontender, BS +  Musc- very weak  Skin- derm protectors on legs, scratches on bilateral feet noted. No noted erythema or warmth  Psych- as above          Assessment/Plan:  COVID-19  - diagnosed on routine rapid screen today. Patient is weak, fatigued and with occasional cough. Discussed adding antiviral with patient's son and with hospice. Decision made not to add d/t comfort care goals and patient current clinical status. Patient PO has dropped off. Comfort meds in place.   Scratch marks  - ? Scratching or itching. Has derm protectors on legs bilaterally. No evidence of cellulitis  - keep skin clean, dry and keep socks on feet    NSTEMI (non-ST elevated myocardial infarction) (H)  - medically managed- conservative, comfort care goals per patient and family  Nonischemic cardiomyopathy (H)  - known  Acute on chronic HFrEF (heart failure with reduced ejection fraction) (H)  - EF 10 -15 % on recent echo. Trace pedal edema  - Hold Lasix- as po has " dropped off and patient appears slightly dry. (Discussed with hospice)  - encourage po fluids as/if patient tolerates, continue frequent oral cares          Orders:  - written on unit and discussed with nursing, hospice, patient's son.    Electronically signed by: GILL Garza CNP

## 2023-02-13 ENCOUNTER — TELEPHONE (OUTPATIENT)
Dept: RHEUMATOLOGY | Facility: CLINIC | Age: 88
End: 2023-02-13
Payer: MEDICARE

## 2023-02-13 NOTE — TELEPHONE ENCOUNTER
'Notification of  Patient Status' form completed and emailed to [email or web address], as well at [email or web address].